# Patient Record
(demographics unavailable — no encounter records)

---

## 2018-11-12 NOTE — EKG
[facility]

              [address] [place] [address]

Test Date:    2018               Test Time:    11:24:12

Pat Name:     NICOLLE GUEVARA              Department:   

Patient ID:   [id number]           Room:          

Gender:       M                        Technician:   

:          1947               Requested By: KRISTINE RODRIGUEZ

Order Number: [id number]           Reading MD:   Crescencio Alexis MD

                                 Measurements

Intervals                              Axis          

Rate:         86                       P:            156

DC:           148                      QRS:          -22

QRSD:         70                       T:            151

QT:           350                                    

QTc:          422                                    

                           Interpretive Statements

SR

MISSING LEADS

NON-SPECIFIC ST/T CHANGES

Electronically Signed On 2018 10:57:37 CST by Crescencio Alexis MD

## 2018-11-12 NOTE — PHYS DOC
Past Medical History


Past Medical History:  High Cholesterol, Hypertension, MI


Past Surgical History:  Angioplasty, Coronary Bypass Surgery, Other


Additional Past Surgical Histo:  l hip sx, 5 vessle cabg, stents x6


Alcohol Use:  None


Drug Use:  None





Adult General


Chief Complaint


Chief Complaint:  PAIN ON URINATION





HPI


HPI





Patient is a 71  year old male with history of high cholesterol, hypertension, 

BPH, who presents today with difficulty voiding that began yesterday. Patient 

states he has enlarged prostate and is on medication. He states since yesterday 

he can only dribble small amount of urine and that's after pushing hard to 

void. Patient denies any hematuria. Denies any dysuria. He is also complaining 

of chronic shortness of breath. He states he recently stopped smoking. Patient 

denies any chest pain. He states he is supposed to be on an breathing 

treatments but he cannot afford the inhaler.





Review of Systems


Review of Systems





Constitutional: Denies fever or chills []


Eyes: Denies change in visual acuity, redness, or eye pain []


HENT: Denies nasal congestion or sore throat []


Respiratory: Reports chronic shortness of breath, denies coughing


Cardiovascular: No additional information not addressed in HPI []


GI: Denies abdominal pain, nausea, vomiting, bloody stools or diarrhea []


: Reports difficulty voiding. Denies dysuria or hematuria []


Musculoskeletal: Denies back pain or joint pain []


Integument: Denies rash or skin lesions []


Neurologic: Denies headache, focal weakness or sensory changes []








All other systems were reviewed and found to be within normal limits, except as 

documented in this note.





Current Medications


Current Medications





Current Medications








 Medications


  (Trade)  Dose


 Ordered  Sig/Claude  Start Time


 Stop Time Status Last Admin


Dose Admin


 


 Acetaminophen/


 Hydrocodone Bitart


  (Lortab 5/325)  1 tab  1X  ONCE  11/12/18 11:30


 11/12/18 11:33 DC 11/12/18 11:51


1 TAB


 


 Albuterol/


 Ipratropium


  (Duoneb)  3 ml  1X  ONCE  11/12/18 11:00


 11/12/18 11:01 DC 11/12/18 11:33


3 ML


 


 Ceftriaxone Sodium  50 ml @ 


 100 mls/hr  1X  ONCE  11/12/18 12:15


 11/12/18 12:44 DC 11/12/18 12:15


100 MLS/HR


 


 Morphine Sulfate


  (Morphine


 Sulfate)  5 mg  1X  ONCE  11/12/18 13:15


 11/12/18 13:16 DC 11/12/18 13:05


5 MG


 


 Tamsulosin HCl


  (Flomax)  0.4 mg  1X  ONCE  11/12/18 11:00


 11/12/18 11:01 DC 11/12/18 11:50


0.4 MG











Allergies


Allergies





Allergies








Coded Allergies Type Severity Reaction Last Updated Verified


 


  No Known Drug Allergies    9/4/15 No











Physical Exam


Physical Exam





Constitutional: Well developed, well nourished, no acute distress, non-toxic 

appearance. []


HENT: Normocephalic, atraumatic, bilateral external ears normal, oropharynx 

moist, no oral exudates, nose normal. []


Eyes: PERRLA, EOMI, conjunctiva normal, no discharge. [] 


Neck: Normal range of motion, no tenderness, supple, no stridor. [] 


Cardiovascular:Heart rate regular rhythm, no murmur []


Lungs & Thorax:  Bilateral breath sounds clear to auscultation []


Abdomen: Bowel sounds normal, soft, no tenderness, no masses, no pulsatile 

masses. [] 


Skin: Warm, dry, no erythema, no rash. [] 


Back: No tenderness, no CVA tenderness. [] 


Extremities: No tenderness, no cyanosis, no clubbing, ROM intact, no edema. [] 


Neurologic: Alert and oriented X 3, normal motor function, normal sensory 

function, no focal deficits noted. []


Psychologic: Affect normal, judgement normal, mood normal. []





Current Patient Data


Vital Signs





 Vital Signs








  Date Time  Temp Pulse Resp B/P (MAP) Pulse Ox O2 Delivery O2 Flow Rate FiO2


 


11/12/18 13:05   16  99   


 


11/12/18 11:33      Room Air  


 


11/12/18 10:15 99.2 93  118/55 (76)    





 99.2       








Lab Values





 Laboratory Tests








Test


 11/12/18


11:00 11/12/18


11:45


 


Urine Collection Type Unknown   


 


Urine Color Yellow   


 


Urine Clarity Turbid   


 


Urine pH 6.0   


 


Urine Specific Gravity 1.020   


 


Urine Protein


 30 mg/dL


(NEG-TRACE) 





 


Urine Glucose (UA)


 Negative mg/dL


(NEG) 





 


Urine Ketones (Stick)


 Negative mg/dL


(NEG) 





 


Urine Blood


 Moderate (NEG)


 





 


Urine Nitrite


 Positive (NEG)


 





 


Urine Bilirubin


 Negative (NEG)


 





 


Urine Urobilinogen Dipstick


 1.0 mg/dL (0.2


mg/dL) 





 


Urine Leukocyte Esterase Large (NEG)   


 


Urine RBC


 Occ /HPF (0-2)


 





 


Urine WBC


 >40 /HPF (0-4)


 





 


Urine Squamous Epithelial


Cells Occ /LPF  


 





 


Urine Bacteria


 Many /HPF


(0-FEW) 





 


White Blood Count


 


 20.7 x10^3/uL


(4.0-11.0)  H


 


Red Blood Count


 


 4.50 x10^6/uL


(4.30-5.70)


 


Hemoglobin


 


 13.6 g/dL


(13.0-17.5)


 


Hematocrit


 


 40.2 %


(39.0-53.0)


 


Mean Corpuscular Volume


 


 89 fL ()





 


Mean Corpuscular Hemoglobin  30 pg (25-35)  


 


Mean Corpuscular Hemoglobin


Concent 


 34 g/dL


(31-37)


 


Red Cell Distribution Width


 


 14.9 %


(11.5-14.5)  H


 


Platelet Count


 


 232 x10^3/uL


(140-400)


 


Neutrophils (%) (Auto)  85 % (31-73)  H


 


Lymphocytes (%) (Auto)  4 % (24-48)  L


 


Monocytes (%) (Auto)  11 % (0-9)  H


 


Eosinophils (%) (Auto)  0 % (0-3)  


 


Basophils (%) (Auto)  0 % (0-3)  


 


Neutrophils # (Auto)


 


 17.6 x10^3uL


(1.8-7.7)  H


 


Lymphocytes # (Auto)


 


 0.9 x10^3/uL


(1.0-4.8)  L


 


Monocytes # (Auto)


 


 2.2 x10^3/uL


(0.0-1.1)  H


 


Eosinophils # (Auto)


 


 0.0 x10^3/uL


(0.0-0.7)


 


Basophils # (Auto)


 


 0.0 x10^3/uL


(0.0-0.2)


 


Segmented Neutrophils %  75 % (35-66)  H


 


Band Neutrophils %  7 % (0-9)  


 


Lymphocytes %  6 % (24-48)  L


 


Monocytes %  12 % (0-10)  H


 


Toxic Granulation  Slight  


 


Platelet Estimate


 


 Adequate


(ADEQUATE)


 


Sodium Level


 


 138 mmol/L


(136-145)


 


Potassium Level


 


 4.1 mmol/L


(3.5-5.1)


 


Chloride Level


 


 101 mmol/L


()


 


Carbon Dioxide Level


 


 25 mmol/L


(21-32)


 


Anion Gap  12 (6-14)  


 


Blood Urea Nitrogen


 


 39 mg/dL


(8-26)  H


 


Creatinine


 


 2.2 mg/dL


(0.7-1.3)  H


 


Estimated GFR


(Cockcroft-Gault) 


 29.7  





 


BUN/Creatinine Ratio  18 (6-20)  


 


Glucose Level


 


 110 mg/dL


(70-99)  H


 


Calcium Level


 


 9.0 mg/dL


(8.5-10.1)


 


Magnesium Level


 


 1.9 mg/dL


(1.8-2.4)


 


Total Bilirubin


 


 1.8 mg/dL


(0.2-1.0)  H


 


Aspartate Amino Transferase


(AST) 


 16 U/L (15-37)





 


Alanine Aminotransferase (ALT)


 


 20 U/L (16-63)





 


Alkaline Phosphatase


 


 70 U/L


()


 


Troponin I Quantitative


 


 < 0.017 ng/mL


(0.000-0.055)


 


NT-Pro-B-Type Natriuretic


Peptide 


 5531 pg/mL


(0-124)  H


 


Total Protein


 


 7.3 g/dL


(6.4-8.2)


 


Albumin


 


 3.2 g/dL


(3.4-5.0)  L


 


Albumin/Globulin Ratio


 


 0.8 (1.0-1.7)


L


 


Thyroid Stimulating Hormone


(TSH) 


 1.746 uIU/mL


(0.358-3.74)





 Laboratory Tests


11/12/18 11:45








 Laboratory Tests


11/12/18 11:45














EKG


EKG


11:24 Interpreted by Dr. fernando sinus rhythm heart rate 86 no STEMI[]





Radiology/Procedures


Radiology/Procedures


[]PROCEDURE: CT ABDOMEN PELVIS WO CONTRAST





Examination: CT ABDOMEN PELVIS WO CONTRAST


 


History: inability to void/enlarged prostate


 


Comparison/Correlation: None


 


Findings: Axial images of the abdomen and pelvis were obtained without 


contrast. Sagittal and coronal provided. Lack of IV contrast may limit 


detection for mass lesions and inflammatory processes. Emphysematous 


involvement of the lung bases noted. Linear scarring or atelectasis 


involves the lingula.


 


Low-attenuation involving the intersegmental fissure region of the liver 


is present. There are 2 foci present and may represent focal fatty 


infiltration. Gallbladder fossa is unremarkable.


 


Spleen, pancreas, and adrenal glands are normal. Normal renal contours. No


hydronephrosis. No radiopaque collecting system calculus.


 


There is a diverticulum involving the right lateral upper urinary bladder 


and this measures up to 1.8 cm in diameter. Urinary bladder is grossly 


unremarkable otherwise. Evaluation of the lower urinary bladder is limited


due to left hip joint prosthesis and associated artifact.


 


Appendix is normal. No bowel obstruction. Minimal diverticulosis of the 


colon.


 


Prostate gland measures 5.7 cm transverse by 3.9 cm anteroposterior. No 


enlarged pelvic lymph nodes. No pelvic free fluid.


 


Kyphoplasty at L2 is noted.


 


Impression:


Prostatomegaly. No significant wall thickening of the urinary bladder. 


Urinary bladder is moderately distended. Urinary bladder diverticulum is 


small in size.


 


No radiopaque collecting system calculi.


 


Electronically signed by: Scott Rodriguez MD (11/12/2018 11:30 AM) [facility]














DICTATED and SIGNED BY:     SCOTT RODRIGUEZ MD


DATE:     11/12/18 1122


PROCEDURE: PORTABLE CHEST 1V





PORTABLE CHEST 1V


 


History: CHEST TIGHTNESS X TODAY.


 


Comparison: January 4, 2017


 


Cardiomediastinal silhouette is stable and not enlarged.


Subtle nodular opacity at the left lung base laterally, likely just 


overlapping rib and vascular shadows. No focal consolidation.


No pneumothorax identified.


No evidence of pleural effusion.  Postsurgical changes identified in the 


chest.


 


Impression:  Subtle nodular opacity at the left lung base laterally, not 


seen previously, but may just represent overlapping normal shadows. 


Consider short-term follow-up or CT chest. No acute findings. 


 


Electronically signed by: Jean Carlos Holder MD (11/12/2018 12:22 PM) 


[facility]














DICTATED and SIGNED BY:     JEAN CARLOS HOLDER MD


DATE:     11/12/18 1220





Course & Med Decision Making


Course & Med Decision Making


Pertinent Labs and Imaging studies reviewed. (See chart for details)





This is a 71-year-old male patient presented to the ED today complaining of 

difficulty voiding since yesterday, has history of enlarged prostate. Also 

complaining of chronic shortness of breath. On arrival to the ED temperature 

was 99.2, heart rate 93, O2 sats 97% on room air, respirations 16 on room air, 

blood pressure 118/55. Patient was given a DuoNeb treatment for his chronic 

shortness of breath. Guerrero was placed on arrival to the ED. 300 mL immediate 

drainage noted in the Guerrero bag.





EKG is negative for any acute findings, troponin is normal, CBC with WBC of 

20.7. CMP with creatinine of 2.2 BUN 39, patient creatinine has been going up 

looking at his previous labs.





Chest x-ray was noted for possible lung nodule with recommendation to follow-up 

as an outpatient for CT.





CT of the abdomen and pelvic without IV contrast was noted for prostatomegaly.





Patient was given Rocephin in the ED, also ordered Cipro on admission.





Consulted with Dr. Agarwal who accepted patient for admission. Patient was given 

IV fluids as well as. Routine consult placed for urology and nephrology.





Dragon Disclaimer


Dragon Disclaimer


This electronic medical record was generated, in whole or in part, using a 

voice recognition dictation system.





Departure


Departure


Impression:  


 Primary Impression:  


 Urinary retention


 Additional Impressions:  


 Acute pyelonephritis


 Leukocytosis


 Chronic shortness of breath


 Acute on chronic renal failure


Disposition:  09 ADMITTED AS INPATIENT


Condition:  LEFT WITHOUT BEING SEEN


Referrals:  


KARLOS GUAMAN (PCP)





Problem Qualifiers








 Additional Impressions:  


 Leukocytosis


 Leukocytosis type:  unspecified  Qualified Codes:  D72.829 - Elevated white 

blood cell count, unspecified


 Acute on chronic renal failure


 Acute renal failure type:  unspecified  Chronic kidney disease stage:  

unspecified stage  Qualified Codes:  N17.9 - Acute kidney failure, unspecified; 

N18.9 - Chronic kidney disease, unspecified








KRISTINE RODRIGUEZ APRN Nov 12, 2018 11:39

## 2018-11-12 NOTE — RAD
PORTABLE CHEST 1V

 

History: CHEST TIGHTNESS X TODAY.

 

Comparison: January 4, 2017

 

Cardiomediastinal silhouette is stable and not enlarged.

Subtle nodular opacity at the left lung base laterally, likely just 

overlapping rib and vascular shadows. No focal consolidation.

No pneumothorax identified.

No evidence of pleural effusion.  Postsurgical changes identified in the 

chest.

 

Impression:  Subtle nodular opacity at the left lung base laterally, not 

seen previously, but may just represent overlapping normal shadows. 

Consider short-term follow-up or CT chest. No acute findings. 

 

Electronically signed by: Jean Carlos Holder MD (11/12/2018 12:22 PM) 

[facility]

## 2018-11-12 NOTE — EKG
[facility]

              [address] [place] [address]

Test Date:    2018               Test Time:    11:58:09

Pat Name:     NICOLLE GUEVARA              Department:   

Patient ID:   [id number]           Room:          

Gender:       M                        Technician:   

:          1947               Requested By: KRISTINE RODRIGUEZ

Order Number: [id number]           Reading MD:   Crescencio Alexis MD

                                 Measurements

Intervals                              Axis          

Rate:         88                       P:            161

SD:           142                      QRS:          -23

QRSD:         78                       T:            150

QT:           378                                    

QTc:          461                                    

                           Interpretive Statements

SINUS RHYTHM

MISSING LEADS

Electronically Signed On 2018 10:57:58 CST by Cerscencio Alexis MD

## 2018-11-12 NOTE — RAD
Examination: CT ABDOMEN PELVIS WO CONTRAST

 

History: inability to void/enlarged prostate

 

Comparison/Correlation: None

 

Findings: Axial images of the abdomen and pelvis were obtained without 

contrast. Sagittal and coronal provided. Lack of IV contrast may limit 

detection for mass lesions and inflammatory processes. Emphysematous 

involvement of the lung bases noted. Linear scarring or atelectasis 

involves the lingula.

 

Low-attenuation involving the intersegmental fissure region of the liver 

is present. There are 2 foci present and may represent focal fatty 

infiltration. Gallbladder fossa is unremarkable.

 

Spleen, pancreas, and adrenal glands are normal. Normal renal contours. No

hydronephrosis. No radiopaque collecting system calculus.

 

There is a diverticulum involving the right lateral upper urinary bladder 

and this measures up to 1.8 cm in diameter. Urinary bladder is grossly 

unremarkable otherwise. Evaluation of the lower urinary bladder is limited

due to left hip joint prosthesis and associated artifact.

 

Appendix is normal. No bowel obstruction. Minimal diverticulosis of the 

colon.

 

Prostate gland measures 5.7 cm transverse by 3.9 cm anteroposterior. No 

enlarged pelvic lymph nodes. No pelvic free fluid.

 

Kyphoplasty at L2 is noted.

 

Impression:

Prostatomegaly. No significant wall thickening of the urinary bladder. 

Urinary bladder is moderately distended. Urinary bladder diverticulum is 

small in size.

 

No radiopaque collecting system calculi.

 

Electronically signed by: Scott Davis MD (11/12/2018 11:30 AM) [facility]

## 2018-11-12 NOTE — HP
ADMIT DATE:  11/12/2018



CHIEF COMPLAINT:  Difficulty voiding, pain with urination.



HISTORY OF PRESENT ILLNESS:  The patient is a pleasant 71-year-old male who

presents with the above chief complaints.  Basically for the past couple of

days, he has been having problems urinating.  He has painful urination as well

and cannot hardly go.  It has been worsening over the past few weeks, but in

particular the past couple of days.  He has a known history of BPH.  He has been

dribbling some urine.  We tried increasing his Flomax, but that did not seem to

work.  He denies any hematuria.  Rates his symptoms at 7/10 and describes them

as agonizing.  I discussed the case with ER physician.  The patient also has

renal failure with a creatinine of 2.2.  We are going to admit the patient and

consult Urology.



PAST MEDICAL HISTORY:  BPH, hypertension, myocardial infarction, angioplasty,

coronary bypass, 6 stents.



ALLERGIES:  None.



FAMILY HISTORY:  Diabetes and coronary artery disease.



SOCIAL HISTORY:  He is retired.  Does not drink, smoke or take drugs.



MEDICATIONS:  Reviewed, please refer to the MRAD.  He is on 7 home meds

including Plavix, Lipitor, metoprolol, lisinopril, aspirin, oxycodone, Protonix.



REVIEW OF SYSTEMS:

GENERAL:  No history of weight change, weakness or fevers.

SKIN:  No bruising, hair changes or rashes.

EYES:  No blurred, double or loss of vision.

NOSE AND THROAT:  No history of nosebleeds, hoarseness or sore throat.

HEART:  No history of palpitations, chest pain or shortness of breath on

exertion.

LUNGS:  Denies cough, hemoptysis, wheezing or shortness of breath.

GASTROINTESTINAL:  Denies changes in appetite, nausea, vomiting, diarrhea or

constipation.

GENITOURINARY:  He complains of dysuria and difficulty starting his stream.

NEUROLOGIC:  Denies history of numbness, tingling, tremor or weakness.

PSYCHIATRIC:  No history of panic, anxiety or depression.

ENDOCRINE:  No history of heat or cold intolerance, polyuria or polydipsia.

EXTREMITIES:  Denies muscle weakness, joint pain, pain on walking or stiffness.



PHYSICAL EXAMINATION:

VITAL SIGNS:  Temperature afebrile, pulse 74, respirations 18, blood pressure

142/81.

GENERAL:  He is alert, cooperative.  His family is present.

HEART:  Normal S1, S2.

LUNGS:  Clear to auscultation.

ABDOMEN:  Soft, little distended, tender.

EXTREMITIES:  Trace edema.

SKIN:  No rashes.

ENDOCRINE:  No thyromegaly.

LYMPHATICS:  No cervical nodes.

HEMATOPOIETIC:  No bruising.

PSYCHIATRIC:  He is stable.



LABORATORY DATA:  BUN 39, creatinine 2.2, white count 21.  Urinalysis shows a

UTI with large amount of leukocyte esterase and greater than 40 white cells.



ASSESSMENT AND PLAN:  Pyelonephritis, severe benign prostatic hypertrophy, acute

renal failure secondary to above.  The patient has been admitted.  We placed a

Guerrero.  We will start IV antibiotics, IV fluids.  Consult Urology.  Continue

home medicines.  Suspect he is going to need a TURP.



PROGNOSIS:  Guarded.

 



______________________________

LUANA WIGGINS DO



DR:  TILA/gurpreet  JOB#:  [id number] / [id number]

DD:  11/12/2018 17:24  DT:  11/12/2018 17:49

## 2018-11-13 NOTE — RAD
CT CHEST WO CONTRAST dated 11/13/2018 2:06 PM

 

Indication: Pulmonary nodule, chronic shortness of breathF/U NODULE.<BR>.

 

Comparison: Chest x-ray dated 11/12/2018. CT abdomen dated 11/12/2018.

 

Technique: Contiguous axial imaging of the chest performed without the 

administration of intravenous contrast. 

One or more of the following individualized dose reduction techniques were

utilized for this examination:  1. Automated exposure control  2. 

Adjustment of the mA and/or kV according to patient size  3. Use of 

iterative reconstruction technique

 

Findings: 

Linear bands of increased density in the bilateral lower lobes and 

lingula, likely scar or atelectasis. There is also some thickening of the 

major fissure on the right. No consolidation or pleural effusion. No 

pneumothorax. There is mild emphysema. Mild diffuse bronchial wall 

thickening.

 

Heart size within normal limits. Extensive coronary artery calcifications 

status post CABG procedure. Mild ectasia of the ascending thoracic aorta 

measuring 3.8 cm transverse. No mediastinal, hilar or axillary 

lymphadenopathy. Borderline enlarged subcarinal lymph node measures 9 mm 

short axis. Thyroid gland unremarkable.

 

Limited images of the upper abdomen show a couple of vague low-density 

lesions in the left lobe liver on images 59 and 60, measuring up to 10 mm 

maximum dimension. These are not well evaluated in the absence of contrast

material. Spleen is normal in size. Pancreas is somewhat atrophic.

 

Bone windows show no acute findings. Multilevel spondylosis. There is 

evidence of prior vertebroplasty at L2. Multilevel spondylosis. Prominent 

anterior osteophytes with partial fusion.

 

IMPRESSION:

1. No suspicious pulmonary nodule or mass.

2. Bibasilar scar or atelectasis with diffuse bronchial wall thickening.

3. Mild emphysema.

4. Mild ectasia of the ascending thoracic aorta.

5. Indeterminate small low-density nodules in the liver, stable from prior

exam.   

 

Electronically signed by: Jean Carlos Restrepo MD (11/13/2018 5:12 PM) 

[facility]

## 2018-11-13 NOTE — PDOC2
CONSULT


Date of Consult


Date of Consult


DATE: 11/13/18 


TIME: 10:55





Reason for Consult


Reason for Consult:


FAWAD





Referring Physician


Referring Physician:


FELICIANO





Identification/Chief Complaint


Chief Complaint


CANT URINATE





Source


Source:  Chart review, Patient





History of Present Illness


Reason for Visit:


THIS IS A 71 YR OLD WITH SEVERAL DAY HX OF NOT BEING ABLE TO URINATE WELL. ON 

ADMIT HAD RETENTION OF ABOUT ONE LITER. CR OF 2.2. USUAL BASELINE CR IS ABOUT 

1.2-1.3 C/W STAGE 3 CKD. UTI NOTED WITH POSSIBLE PYELONEPHRITIS. HAS HX OF HTN 

AND BPH. NO NSAIDS OR OTHER NEPHROTOXINS. NO OTHER  HX





Past Medical History


Cardiovascular:  CAD, CHF, HTN, Hyperlipidemia


Pulmonary:  COPD


CENTRAL NERVOUS SYSTEM:  Other


GI:  GERD


Heme/Onc:  No pertinent hx


Hepatobiliary:  No pertinent hx


Psych:  No pertinent hx


Musculoskeletal:  Osteoarthritis


Rheumatologic:  No pertinent hx


Infectious disease:  No pertinent hx


Renal/:  Chronic renal insuff, Benign prostatic enlarg.


Endocrine:  No pertinent hx





Past Surgical History


Past Surgical History:  Other





Family History


Family History:  Hypertension





Social History


Quit (2 weeks ago)


ALCOHOL:  none


Drugs:  None


Lives:  with Family





Current Problem List


Problem List


Problems


Medical Problems:


(1) Acute on chronic renal failure


Status: Acute  





(2) Chronic shortness of breath


Status: Acute  





(3) Leukocytosis


Status: Acute  











Current Medications


Current Medications





Current Medications


Tamsulosin HCl (Flomax) 0.4 mg 1X  ONCE PO  Last administered on 11/12/18at 11:

50;  Start 11/12/18 at 11:00;  Stop 11/12/18 at 11:01;  Status DC


Albuterol/ Ipratropium (Duoneb) 3 ml 1X  ONCE NEB  Last administered on 11/12/ 18at 11:33;  Start 11/12/18 at 11:00;  Stop 11/12/18 at 11:01;  Status DC


Acetaminophen/ Hydrocodone Bitart (Lortab 5/325) 1 tab 1X  ONCE PO  Last 

administered on 11/12/18at 11:51;  Start 11/12/18 at 11:30;  Stop 11/12/18 at 11

:33;  Status DC


Ceftriaxone Sodium 50 ml @  100 mls/hr 1X  ONCE IV  Last administered on 11/12/ 18at 12:15;  Start 11/12/18 at 12:15;  Stop 11/12/18 at 12:44;  Status DC


Morphine Sulfate (Morphine Sulfate) 5 mg 1X  ONCE IV  Last administered on 11/12 /18at 13:05;  Start 11/12/18 at 13:15;  Stop 11/12/18 at 13:16;  Status DC


Sodium Chloride 1,000 ml @  1,000 mls/hr 1X  ONCE IV  Last administered on 11/12 /18at 14:31;  Start 11/12/18 at 14:30;  Stop 11/12/18 at 15:29;  Status DC


Ondansetron HCl (Zofran) 4 mg PRN Q8HRS  PRN IV NAUSEA/VOMITING Last 

administered on 11/12/18at 21:11;  Start 11/12/18 at 14:30;  Stop 11/13/18 at 14

:29


Morphine Sulfate (Morphine Sulfate) 4 mg PRN Q2HR  PRN IV PAIN;  Start 11/12/18 

at 14:30;  Stop 11/13/18 at 14:29


Acetaminophen (Tylenol) 650 mg PRN Q4HRS  PRN PO FEVER;  Start 11/12/18 at 14:30

;  Stop 11/13/18 at 14:29


Sodium Chloride 1,000 ml @  75 mls/hr [id number] IV  Last administered on 11/13/ 18at 05:15;  Start 11/12/18 at 14:30


Ciprofloxacin/ Dextrose 200 ml @  200 mls/hr Q12HR IV  Last administered on 11/ 13/18at 08:08;  Start 11/12/18 at 21:00


Tamsulosin HCl (Flomax) 0.4 mg DAILY PO  Last administered on 11/13/18at 08:09;

  Start 11/13/18 at 09:00


Ondansetron HCl (Zofran) 4 mg 1X  ONCE IV  Last administered on 11/12/18at 14:31

;  Start 11/12/18 at 14:30;  Stop 11/12/18 at 14:31;  Status DC


Aspirin (Ecotrin) 325 mg DAILY PO  Last administered on 11/13/18at 08:08;  

Start 11/13/18 at 09:00


Atorvastatin Calcium (Lipitor) 20 mg QHS PO  Last administered on 11/12/18at 20:

31;  Start 11/12/18 at 21:00


Metoprolol Succinate (Toprol Xl) 25 mg HS PO ;  Start 11/12/18 at 21:00


Pantoprazole Sodium (Protonix) 40 mg DAILYAC PO  Last administered on 11/13/ 18at 08:08;  Start 11/13/18 at 07:30


Lisinopril (Prinivil) 2.5 mg DAILY PO ;  Start 11/13/18 at 09:00


Oxycodone/ Acetaminophen (Percocet 5/325) 2 tab PRN Q4HRS  PRN PO MODERATE PAIN

, 2ND CHOICE Last administered on 11/12/18at 20:30;  Start 11/12/18 at 17:45


Tamsulosin HCl (Flomax) 0.4 mg QHS PO  Last administered on 11/12/18at 20:31;  

Start 11/12/18 at 21:00


Sodium Chloride 1,000 ml @  75 mls/hr [id number] IV ;  Start 11/12/18 at 17:45;  

Stop 11/12/18 at 19:12;  Status DC


Promethazine HCl/ Codeine (Phenergan With Codeine Oral Syrup) 5 ml PRN Q4HRS  

PRN PO COUGH Last administered on 11/13/18at 08:08;  Start 11/12/18 at 20:45


Promethazine HCl/ Codeine (Phenergan With Codeine Oral Syrup) 10 ml PRN Q4HRS  

PRN PO COUGH;  Start 11/12/18 at 20:45


Sodium Chloride 500 ml @  500 mls/hr 1X  ONCE IV  Last administered on 11/13/ 18at 05:19;  Start 11/13/18 at 05:30;  Stop 11/13/18 at 06:29;  Status DC


Influenza Virus Vaccine (Afluria Trivalent [id number] Syringe) 0.5 ml ONCE ONCE 

VAX IM  Last administered on 11/13/18at 09:45;  Start 11/13/18 at 11:00;  Stop 

11/13/18 at 11:01


Lactobacillus Rhamnosus (Culturelle) 1 cap BID PO ;  Start 11/13/18 at 21:00





Active Scripts


Active


[Oxycodone Hcl/Acetaminophen] 1 TAB Tablet 2 Tab PO PRN Q4HRS PRN


Plavix (Clopidogrel Bisulfate) 75 Mg Tablet 75 Mg PO [id number]


Lipitor (Atorvastatin Calcium) 20 Mg Tablet 20 Mg PO QHS


Reported


Lisinopril 2.5 Mg Tablet 2.5 Mg PO DAILY


Metoprolol Succinate ( Xl ) (Metoprolol Succinate) 25 Mg Tab.er.24h 25 Mg PO HS


Protonix (Pantoprazole Sodium) 40 Mg Tablet. 40 Mg PO DAILY


Oxycodone-Acetaminophen 5-325 (Oxycodone Hcl/Acetaminophen) 1 Each Tablet 2 

Each PO Q6HRS PRN


Aspirin Ec (Aspirin) 325 Mg Tablet.dr 1 Tab PO DAILY





Allergies


Allergies:  


Coded Allergies:  


     No Known Drug Allergies (Unverified , 9/4/15)





ROS


General:  YES: Fatigue, Malaise, Appetite


PSYCHOLOGICAL ROS:  YES: Anxiety


Eyes:  Yes Decreased vision


HEENT:  YES: Heacaches


Respiratory:  YES: Cough


Gastrointestinal:  Yes Nausea


Genitourinary:  YES Retention


Musculoskeletal:  Yes Muscular Weakness


Neurological:  Yes Weakness


Skin:  Yes Dry Skin





Physical Exam


General:  Alert, Oriented X3, Cooperative, No acute distress


HEENT:  Atraumatic, PERRLA, EOMI, Mucous membr. moist/pink


Lungs:  Clear to auscultation


Heart:  Regular rate, Normal S1, Normal S2


Abdomen:  Normal bowel sounds, Soft


Extremities:  No clubbing


Skin:  No rashes


Neuro:  Normal speech, Cranial nerves 3-12 NL


Psych/Mental Status:  Mental status NL, Mood NL


MUSCULOSKELETAL:  No deformity, No swelling





Vitals


VITALS





Vital Signs








  Date Time  Temp Pulse Resp B/P (MAP) Pulse Ox O2 Delivery O2 Flow Rate FiO2


 


11/13/18 08:09  65  95/56    


 


11/13/18 07:22      Room Air  


 


11/13/18 07:00 97.7  20  95   





 97.7       











Labs


Labs





Laboratory Tests








Test


 11/12/18


11:00 11/12/18


11:45 11/12/18


15:07 11/13/18


05:35


 


Urine Collection Type Unknown    


 


Urine Color Yellow    


 


Urine Clarity Turbid    


 


Urine pH 6.0    


 


Urine Specific Gravity 1.020    


 


Urine Protein


 30 mg/dL


(NEG-TRACE) 


 


 





 


Urine Glucose (UA)


 Negative mg/dL


(NEG) 


 


 





 


Urine Ketones (Stick)


 Negative mg/dL


(NEG) 


 


 





 


Urine Blood Moderate (NEG)    


 


Urine Nitrite Positive (NEG)    


 


Urine Bilirubin Negative (NEG)    


 


Urine Urobilinogen Dipstick


 1.0 mg/dL (0.2


mg/dL) 


 


 





 


Urine Leukocyte Esterase Large (NEG)    


 


Urine RBC Occ /HPF (0-2)    


 


Urine WBC >40 /HPF (0-4)    


 


Urine Squamous Epithelial


Cells Occ /LPF 


 


 


 





 


Urine Bacteria


 Many /HPF


(0-FEW) 


 


 





 


White Blood Count


 


 20.7 x10^3/uL


(4.0-11.0) 


 16.2 x10^3/uL


(4.0-11.0)


 


Red Blood Count


 


 4.50 x10^6/uL


(4.30-5.70) 


 3.71 x10^6/uL


(4.30-5.70)


 


Hemoglobin


 


 13.6 g/dL


(13.0-17.5) 


 11.3 g/dL


(13.0-17.5)


 


Hematocrit


 


 40.2 %


(39.0-53.0) 


 33.5 %


(39.0-53.0)


 


Mean Corpuscular Volume  89 fL ()   90 fL () 


 


Mean Corpuscular Hemoglobin  30 pg (25-35)   31 pg (25-35) 


 


Mean Corpuscular Hemoglobin


Concent 


 34 g/dL


(31-37) 


 34 g/dL


(31-37)


 


Red Cell Distribution Width


 


 14.9 %


(11.5-14.5) 


 15.2 %


(11.5-14.5)


 


Platelet Count


 


 232 x10^3/uL


(140-400) 


 166 x10^3/uL


(140-400)


 


Neutrophils (%) (Auto)  85 % (31-73)   81 % (31-73) 


 


Lymphocytes (%) (Auto)  4 % (24-48)   9 % (24-48) 


 


Monocytes (%) (Auto)  11 % (0-9)   10 % (0-9) 


 


Eosinophils (%) (Auto)  0 % (0-3)   0 % (0-3) 


 


Basophils (%) (Auto)  0 % (0-3)   1 % (0-3) 


 


Neutrophils # (Auto)


 


 17.6 x10^3uL


(1.8-7.7) 


 13.1 x10^3uL


(1.8-7.7)


 


Lymphocytes # (Auto)


 


 0.9 x10^3/uL


(1.0-4.8) 


 1.4 x10^3/uL


(1.0-4.8)


 


Monocytes # (Auto)


 


 2.2 x10^3/uL


(0.0-1.1) 


 1.5 x10^3/uL


(0.0-1.1)


 


Eosinophils # (Auto)


 


 0.0 x10^3/uL


(0.0-0.7) 


 0.0 x10^3/uL


(0.0-0.7)


 


Basophils # (Auto)


 


 0.0 x10^3/uL


(0.0-0.2) 


 0.1 x10^3/uL


(0.0-0.2)


 


Segmented Neutrophils %  75 % (35-66)   


 


Band Neutrophils %  7 % (0-9)   


 


Lymphocytes %  6 % (24-48)   


 


Monocytes %  12 % (0-10)   


 


Toxic Granulation  Slight   


 


Platelet Estimate


 


 Adequate


(ADEQUATE) 


 





 


Sodium Level


 


 138 mmol/L


(136-145) 


 137 mmol/L


(136-145)


 


Potassium Level


 


 4.1 mmol/L


(3.5-5.1) 


 4.2 mmol/L


(3.5-5.1)


 


Chloride Level


 


 101 mmol/L


() 


 104 mmol/L


()


 


Carbon Dioxide Level


 


 25 mmol/L


(21-32) 


 24 mmol/L


(21-32)


 


Anion Gap  12 (6-14)   9 (6-14) 


 


Blood Urea Nitrogen


 


 39 mg/dL


(8-26) 


 31 mg/dL


(8-26)


 


Creatinine


 


 2.2 mg/dL


(0.7-1.3) 


 1.8 mg/dL


(0.7-1.3)


 


Estimated GFR


(Cockcroft-Gault) 


 29.7 


 


 37.4 





 


BUN/Creatinine Ratio  18 (6-20)   


 


Glucose Level


 


 110 mg/dL


(70-99) 


 107 mg/dL


(70-99)


 


Calcium Level


 


 9.0 mg/dL


(8.5-10.1) 


 8.3 mg/dL


(8.5-10.1)


 


Magnesium Level


 


 1.9 mg/dL


(1.8-2.4) 


 





 


Total Bilirubin


 


 1.8 mg/dL


(0.2-1.0) 


 





 


Aspartate Amino Transf


(AST/SGOT) 


 16 U/L (15-37) 


 


 





 


Alanine Aminotransferase


(ALT/SGPT) 


 20 U/L (16-63) 


 


 





 


Alkaline Phosphatase


 


 70 U/L


() 


 





 


Troponin I Quantitative


 


 < 0.017 ng/mL


(0.000-0.055) 


 





 


NT-Pro-B-Type Natriuretic


Peptide 


 5531 pg/mL


(0-124) 


 





 


Total Protein


 


 7.3 g/dL


(6.4-8.2) 


 





 


Albumin


 


 3.2 g/dL


(3.4-5.0) 


 





 


Albumin/Globulin Ratio  0.8 (1.0-1.7)   


 


Thyroid Stimulating Hormone


(TSH) 


 1.746 uIU/mL


(0.358-3.74) 


 





 


Lactic Acid Level


 


 


 2.3 mmol/L


(0.4-2.0) 











Laboratory Tests








Test


 11/12/18


11:00 11/12/18


11:45 11/12/18


15:07 11/13/18


05:35


 


Urine Collection Type Unknown    


 


Urine Color Yellow    


 


Urine Clarity Turbid    


 


Urine pH 6.0    


 


Urine Specific Gravity 1.020    


 


Urine Protein


 30 mg/dL


(NEG-TRACE) 


 


 





 


Urine Glucose (UA)


 Negative mg/dL


(NEG) 


 


 





 


Urine Ketones (Stick)


 Negative mg/dL


(NEG) 


 


 





 


Urine Blood Moderate (NEG)    


 


Urine Nitrite Positive (NEG)    


 


Urine Bilirubin Negative (NEG)    


 


Urine Urobilinogen Dipstick


 1.0 mg/dL (0.2


mg/dL) 


 


 





 


Urine Leukocyte Esterase Large (NEG)    


 


Urine RBC Occ /HPF (0-2)    


 


Urine WBC >40 /HPF (0-4)    


 


Urine Squamous Epithelial


Cells Occ /LPF 


 


 


 





 


Urine Bacteria


 Many /HPF


(0-FEW) 


 


 





 


White Blood Count


 


 20.7 x10^3/uL


(4.0-11.0) 


 16.2 x10^3/uL


(4.0-11.0)


 


Red Blood Count


 


 4.50 x10^6/uL


(4.30-5.70) 


 3.71 x10^6/uL


(4.30-5.70)


 


Hemoglobin


 


 13.6 g/dL


(13.0-17.5) 


 11.3 g/dL


(13.0-17.5)


 


Hematocrit


 


 40.2 %


(39.0-53.0) 


 33.5 %


(39.0-53.0)


 


Mean Corpuscular Volume  89 fL ()   90 fL () 


 


Mean Corpuscular Hemoglobin  30 pg (25-35)   31 pg (25-35) 


 


Mean Corpuscular Hemoglobin


Concent 


 34 g/dL


(31-37) 


 34 g/dL


(31-37)


 


Red Cell Distribution Width


 


 14.9 %


(11.5-14.5) 


 15.2 %


(11.5-14.5)


 


Platelet Count


 


 232 x10^3/uL


(140-400) 


 166 x10^3/uL


(140-400)


 


Neutrophils (%) (Auto)  85 % (31-73)   81 % (31-73) 


 


Lymphocytes (%) (Auto)  4 % (24-48)   9 % (24-48) 


 


Monocytes (%) (Auto)  11 % (0-9)   10 % (0-9) 


 


Eosinophils (%) (Auto)  0 % (0-3)   0 % (0-3) 


 


Basophils (%) (Auto)  0 % (0-3)   1 % (0-3) 


 


Neutrophils # (Auto)


 


 17.6 x10^3uL


(1.8-7.7) 


 13.1 x10^3uL


(1.8-7.7)


 


Lymphocytes # (Auto)


 


 0.9 x10^3/uL


(1.0-4.8) 


 1.4 x10^3/uL


(1.0-4.8)


 


Monocytes # (Auto)


 


 2.2 x10^3/uL


(0.0-1.1) 


 1.5 x10^3/uL


(0.0-1.1)


 


Eosinophils # (Auto)


 


 0.0 x10^3/uL


(0.0-0.7) 


 0.0 x10^3/uL


(0.0-0.7)


 


Basophils # (Auto)


 


 0.0 x10^3/uL


(0.0-0.2) 


 0.1 x10^3/uL


(0.0-0.2)


 


Segmented Neutrophils %  75 % (35-66)   


 


Band Neutrophils %  7 % (0-9)   


 


Lymphocytes %  6 % (24-48)   


 


Monocytes %  12 % (0-10)   


 


Toxic Granulation  Slight   


 


Platelet Estimate


 


 Adequate


(ADEQUATE) 


 





 


Sodium Level


 


 138 mmol/L


(136-145) 


 137 mmol/L


(136-145)


 


Potassium Level


 


 4.1 mmol/L


(3.5-5.1) 


 4.2 mmol/L


(3.5-5.1)


 


Chloride Level


 


 101 mmol/L


() 


 104 mmol/L


()


 


Carbon Dioxide Level


 


 25 mmol/L


(21-32) 


 24 mmol/L


(21-32)


 


Anion Gap  12 (6-14)   9 (6-14) 


 


Blood Urea Nitrogen


 


 39 mg/dL


(8-26) 


 31 mg/dL


(8-26)


 


Creatinine


 


 2.2 mg/dL


(0.7-1.3) 


 1.8 mg/dL


(0.7-1.3)


 


Estimated GFR


(Cockcroft-Gault) 


 29.7 


 


 37.4 





 


BUN/Creatinine Ratio  18 (6-20)   


 


Glucose Level


 


 110 mg/dL


(70-99) 


 107 mg/dL


(70-99)


 


Calcium Level


 


 9.0 mg/dL


(8.5-10.1) 


 8.3 mg/dL


(8.5-10.1)


 


Magnesium Level


 


 1.9 mg/dL


(1.8-2.4) 


 





 


Total Bilirubin


 


 1.8 mg/dL


(0.2-1.0) 


 





 


Aspartate Amino Transf


(AST/SGOT) 


 16 U/L (15-37) 


 


 





 


Alanine Aminotransferase


(ALT/SGPT) 


 20 U/L (16-63) 


 


 





 


Alkaline Phosphatase


 


 70 U/L


() 


 





 


Troponin I Quantitative


 


 < 0.017 ng/mL


(0.000-0.055) 


 





 


NT-Pro-B-Type Natriuretic


Peptide 


 5531 pg/mL


(0-124) 


 





 


Total Protein


 


 7.3 g/dL


(6.4-8.2) 


 





 


Albumin


 


 3.2 g/dL


(3.4-5.0) 


 





 


Albumin/Globulin Ratio  0.8 (1.0-1.7)   


 


Thyroid Stimulating Hormone


(TSH) 


 1.746 uIU/mL


(0.358-3.74) 


 





 


Lactic Acid Level


 


 


 2.3 mmol/L


(0.4-2.0) 














Assessment/Plan


Assessment/Plan


IMP





FAWAD CR OF 2.2


CKD STAGE 3 WITH CR OF 1.2-1.3


URINARY RETENTION


PROSTATOMEGALY


UTI/POSSIBLE PYELONEPHRITIS





PLAN





LOONEY


UROLOGY EVAL


ANTIBIOTICS


IVF'S


HOLD ACE-I IF CR NOT IMPROVING


WILL FOLLOW











JAVY SHANNON MD Nov 13, 2018 11:01

## 2018-11-13 NOTE — PDOC2
UROLOGY CONSULT


Date of Consult


Date of Consult


DATE: 11/13/18 


TIME: 08:33





Reason for Consult


Reason for Consult:


Urinary Retention





Referring Physician


Referring Physician:


Dr. Agarwal.





Identification/Chief Complaint


Chief Complaint


Urinary retention/BPH





Source


Source:  Caregiver, Chart review, Patient





History of Present Illness


Reason for Visit:


Patient is a 71  year old male with history of high cholesterol, hypertension, 

BPH, who presented yesterday to the ER with difficulty voiding.  He has a 

history of BPH that he takes Flomax for.  This problem had really been going on 

for 2-3 days and it got really bad yesterday so he came into [facility] ED.  A 

catheter was placed, and the amount out is not charted, but the patient does 

report an output of about one large bag full or 1000 ml.  He denies a history 

of prostate cancer or kidney problems or stones. He also denies hematuria. He 

is not having any pain today and his shaw catheter is not bothering him.





Past Medical History


Cardiovascular:  CAD, CHF, HTN, Hyperlipidemia


Pulmonary:  COPD


CENTRAL NERVOUS SYSTEM:  Other


GI:  GERD


Heme/Onc:  No pertinent hx


Hepatobiliary:  No pertinent hx


Psych:  No pertinent hx


Musculoskeletal:  Osteoarthritis


Rheumatologic:  No pertinent hx


Infectious disease:  No pertinent hx


Renal/:  No pertinent hx (Diagnosed by PCP, on flomax as outpatient.), Benign 

prostatic enlarg.


Endocrine:  No pertinent hx





Past Surgical History


Past Surgical History:  Other





Family History


Family History:  Hypertension





Social History


Quit (2 weeks ago)


ALCOHOL:  none


Drugs:  None


Lives:  with Family





Current Problem List


Problems:  


(1) Shaw catheter in place


(2) Urinary retention





Current Medications


Current Medications





Current Medications


Acetaminophen (Tylenol) 650 mg PRN Q4HRS  PRN PO FEVER;  Start 11/12/18 at 14:30

;  Stop 11/13/18 at 14:29


Acetaminophen/ Hydrocodone Bitart (Lortab 5/325) 1 tab 1X  ONCE PO  Last 

administered on 11/12/18at 11:51;  Start 11/12/18 at 11:30;  Stop 11/12/18 at 11

:33;  Status DC


Albuterol/ Ipratropium (Duoneb) 3 ml 1X  ONCE NEB  Last administered on 11/12/ 18at 11:33;  Start 11/12/18 at 11:00;  Stop 11/12/18 at 11:01;  Status DC


Aspirin (Ecotrin) 325 mg DAILY PO  Last administered on 11/13/18at 08:08;  

Start 11/13/18 at 09:00


Atorvastatin Calcium (Lipitor) 20 mg QHS PO  Last administered on 11/12/18at 20:

31;  Start 11/12/18 at 21:00


Ceftriaxone Sodium 50 ml @  100 mls/hr 1X  ONCE IV  Last administered on 11/12/ 18at 12:15;  Start 11/12/18 at 12:15;  Stop 11/12/18 at 12:44;  Status DC


Ciprofloxacin/ Dextrose 200 ml @  200 mls/hr Q12HR IV  Last administered on 11/ 13/18at 08:08;  Start 11/12/18 at 21:00


Lisinopril (Prinivil) 2.5 mg DAILY PO ;  Start 11/13/18 at 09:00


Metoprolol Succinate (Toprol Xl) 25 mg HS PO ;  Start 11/12/18 at 21:00


Morphine Sulfate (Morphine Sulfate) 4 mg PRN Q2HR  PRN IV PAIN;  Start 11/12/18 

at 14:30;  Stop 11/13/18 at 14:29


Morphine Sulfate (Morphine Sulfate) 5 mg 1X  ONCE IV  Last administered on 11/12 /18at 13:05;  Start 11/12/18 at 13:15;  Stop 11/12/18 at 13:16;  Status DC


Ondansetron HCl (Zofran) 4 mg 1X  ONCE IV  Last administered on 11/12/18at 14:31

;  Start 11/12/18 at 14:30;  Stop 11/12/18 at 14:31;  Status DC


Ondansetron HCl (Zofran) 4 mg PRN Q8HRS  PRN IV NAUSEA/VOMITING Last 

administered on 11/12/18at 21:11;  Start 11/12/18 at 14:30;  Stop 11/13/18 at 14

:29


Oxycodone/ Acetaminophen (Percocet 5/325) 2 tab PRN Q4HRS  PRN PO MODERATE PAIN

, 2ND CHOICE Last administered on 11/12/18at 20:30;  Start 11/12/18 at 17:45


Pantoprazole Sodium (Protonix) 40 mg DAILYAC PO  Last administered on 11/13/ 18at 08:08;  Start 11/13/18 at 07:30


Promethazine HCl/ Codeine (Phenergan With Codeine Oral Syrup) 5 ml PRN Q4HRS  

PRN PO COUGH Last administered on 11/13/18at 08:08;  Start 11/12/18 at 20:45


Promethazine HCl/ Codeine (Phenergan With Codeine Oral Syrup) 10 ml PRN Q4HRS  

PRN PO COUGH;  Start 11/12/18 at 20:45


Sodium Chloride 500 ml @  500 mls/hr 1X  ONCE IV  Last administered on 11/13/ 18at 05:19;  Start 11/13/18 at 05:30;  Stop 11/13/18 at 06:29;  Status DC


Sodium Chloride 1,000 ml @  75 mls/hr [id number] IV  Last administered on 11/13/ 18at 05:15;  Start 11/12/18 at 14:30


Sodium Chloride 1,000 ml @  75 mls/hr [id number] IV ;  Start 11/12/18 at 17:45;  

Stop 11/12/18 at 19:12;  Status DC


Sodium Chloride 1,000 ml @  1,000 mls/hr 1X  ONCE IV  Last administered on 11/12 /18at 14:31;  Start 11/12/18 at 14:30;  Stop 11/12/18 at 15:29;  Status DC


Tamsulosin HCl (Flomax) 0.4 mg 1X  ONCE PO  Last administered on 11/12/18at 11:

50;  Start 11/12/18 at 11:00;  Stop 11/12/18 at 11:01;  Status DC


Tamsulosin HCl (Flomax) 0.4 mg DAILY PO  Last administered on 11/13/18at 08:09;

  Start 11/13/18 at 09:00


Tamsulosin HCl (Flomax) 0.4 mg QHS PO  Last administered on 11/12/18at 20:31;  

Start 11/12/18 at 21:00





Allergies


Allergies:  


Coded Allergies:  


     No Known Drug Allergies (Unverified , 9/4/15)





ROS


Review Of Systems:


CONSTITUTIONAL:        No fever or chills


EYES:                          No recent changes


SKIN:               No rash or itching


CARDIOVASCULAR:     No chest pain, syncope, palpitations, or edema


RESPIRATORY:            No SOB or cough


GASTROINTESTINAL:    No nausea, vomiting or abdominal pain


NEUROLOGICAL:          No headaches or weakness


ENDOCRINE:               No cold or heat intolerance


GENITOURINARY:         + frequency, urinary retention, BPH


MUSCULOSKELETAL:   No back pain or joint pain


LYMPHATICS:               No enlarged lymph nodes


PSYCHIATRIC:              No anxiety or depression





Physical Exam


Physical Exam:


General: Pleasant, no acute distress, well groomed


Eyes: conjunctiva anicteric, eyes full range of motion


ENT: moist oral mucosa, normal dentition


Neck: Trachea midline, no masses


Respiratory: unlabored breathing, not using accessory muscles


Abdomen: nontender, nondistended


Pelvic: Uncirc phallus, WNL. Shaw catheter in place draining clear yellow 

urine. Device in good working order. 


Skin: no rashes or skin lesions on visualized skin


Psych: normal mood, affect. Alert and oriented x 3.





Vitals


VITALS





Vital Signs








  Date Time  Temp Pulse Resp B/P (MAP) Pulse Ox O2 Delivery O2 Flow Rate FiO2


 


11/13/18 08:09  65  95/56    


 


11/13/18 07:22      Room Air  


 


11/13/18 07:00 97.7  20  95   





 97.7       











Labs


Labs





Laboratory Tests








Test


 11/12/18


11:00 11/12/18


11:45 11/12/18


15:07 11/13/18


05:35


 


Urine Collection Type Unknown    


 


Urine Color Yellow    


 


Urine Clarity Turbid    


 


Urine pH 6.0    


 


Urine Specific Gravity 1.020    


 


Urine Protein


 30 mg/dL


(NEG-TRACE) 


 


 





 


Urine Glucose (UA)


 Negative mg/dL


(NEG) 


 


 





 


Urine Ketones (Stick)


 Negative mg/dL


(NEG) 


 


 





 


Urine Blood Moderate (NEG)    


 


Urine Nitrite Positive (NEG)    


 


Urine Bilirubin Negative (NEG)    


 


Urine Urobilinogen Dipstick


 1.0 mg/dL (0.2


mg/dL) 


 


 





 


Urine Leukocyte Esterase Large (NEG)    


 


Urine RBC Occ /HPF (0-2)    


 


Urine WBC >40 /HPF (0-4)    


 


Urine Squamous Epithelial


Cells Occ /LPF 


 


 


 





 


Urine Bacteria


 Many /HPF


(0-FEW) 


 


 





 


White Blood Count


 


 20.7 x10^3/uL


(4.0-11.0) 


 16.2 x10^3/uL


(4.0-11.0)


 


Red Blood Count


 


 4.50 x10^6/uL


(4.30-5.70) 


 3.71 x10^6/uL


(4.30-5.70)


 


Hemoglobin


 


 13.6 g/dL


(13.0-17.5) 


 11.3 g/dL


(13.0-17.5)


 


Hematocrit


 


 40.2 %


(39.0-53.0) 


 33.5 %


(39.0-53.0)


 


Mean Corpuscular Volume  89 fL ()   90 fL () 


 


Mean Corpuscular Hemoglobin  30 pg (25-35)   31 pg (25-35) 


 


Mean Corpuscular Hemoglobin


Concent 


 34 g/dL


(31-37) 


 34 g/dL


(31-37)


 


Red Cell Distribution Width


 


 14.9 %


(11.5-14.5) 


 15.2 %


(11.5-14.5)


 


Platelet Count


 


 232 x10^3/uL


(140-400) 


 166 x10^3/uL


(140-400)


 


Neutrophils (%) (Auto)  85 % (31-73)   81 % (31-73) 


 


Lymphocytes (%) (Auto)  4 % (24-48)   9 % (24-48) 


 


Monocytes (%) (Auto)  11 % (0-9)   10 % (0-9) 


 


Eosinophils (%) (Auto)  0 % (0-3)   0 % (0-3) 


 


Basophils (%) (Auto)  0 % (0-3)   1 % (0-3) 


 


Neutrophils # (Auto)


 


 17.6 x10^3uL


(1.8-7.7) 


 13.1 x10^3uL


(1.8-7.7)


 


Lymphocytes # (Auto)


 


 0.9 x10^3/uL


(1.0-4.8) 


 1.4 x10^3/uL


(1.0-4.8)


 


Monocytes # (Auto)


 


 2.2 x10^3/uL


(0.0-1.1) 


 1.5 x10^3/uL


(0.0-1.1)


 


Eosinophils # (Auto)


 


 0.0 x10^3/uL


(0.0-0.7) 


 0.0 x10^3/uL


(0.0-0.7)


 


Basophils # (Auto)


 


 0.0 x10^3/uL


(0.0-0.2) 


 0.1 x10^3/uL


(0.0-0.2)


 


Segmented Neutrophils %  75 % (35-66)   


 


Band Neutrophils %  7 % (0-9)   


 


Lymphocytes %  6 % (24-48)   


 


Monocytes %  12 % (0-10)   


 


Toxic Granulation  Slight   


 


Platelet Estimate


 


 Adequate


(ADEQUATE) 


 





 


Sodium Level


 


 138 mmol/L


(136-145) 


 137 mmol/L


(136-145)


 


Potassium Level


 


 4.1 mmol/L


(3.5-5.1) 


 4.2 mmol/L


(3.5-5.1)


 


Chloride Level


 


 101 mmol/L


() 


 104 mmol/L


()


 


Carbon Dioxide Level


 


 25 mmol/L


(21-32) 


 24 mmol/L


(21-32)


 


Anion Gap  12 (6-14)   9 (6-14) 


 


Blood Urea Nitrogen


 


 39 mg/dL


(8-26) 


 31 mg/dL


(8-26)


 


Creatinine


 


 2.2 mg/dL


(0.7-1.3) 


 1.8 mg/dL


(0.7-1.3)


 


Estimated GFR


(Cockcroft-Gault) 


 29.7 


 


 37.4 





 


BUN/Creatinine Ratio  18 (6-20)   


 


Glucose Level


 


 110 mg/dL


(70-99) 


 107 mg/dL


(70-99)


 


Calcium Level


 


 9.0 mg/dL


(8.5-10.1) 


 8.3 mg/dL


(8.5-10.1)


 


Magnesium Level


 


 1.9 mg/dL


(1.8-2.4) 


 





 


Total Bilirubin


 


 1.8 mg/dL


(0.2-1.0) 


 





 


Aspartate Amino Transf


(AST/SGOT) 


 16 U/L (15-37) 


 


 





 


Alanine Aminotransferase


(ALT/SGPT) 


 20 U/L (16-63) 


 


 





 


Alkaline Phosphatase


 


 70 U/L


() 


 





 


Troponin I Quantitative


 


 < 0.017 ng/mL


(0.000-0.055) 


 





 


NT-Pro-B-Type Natriuretic


Peptide 


 5531 pg/mL


(0-124) 


 





 


Total Protein


 


 7.3 g/dL


(6.4-8.2) 


 





 


Albumin


 


 3.2 g/dL


(3.4-5.0) 


 





 


Albumin/Globulin Ratio  0.8 (1.0-1.7)   


 


Thyroid Stimulating Hormone


(TSH) 


 1.746 uIU/mL


(0.358-3.74) 


 





 


Lactic Acid Level


 


 


 2.3 mmol/L


(0.4-2.0) 











Laboratory Tests








Test


 11/12/18


11:00 11/12/18


11:45 11/12/18


15:07 11/13/18


05:35


 


Urine Collection Type Unknown    


 


Urine Color Yellow    


 


Urine Clarity Turbid    


 


Urine pH 6.0    


 


Urine Specific Gravity 1.020    


 


Urine Protein


 30 mg/dL


(NEG-TRACE) 


 


 





 


Urine Glucose (UA)


 Negative mg/dL


(NEG) 


 


 





 


Urine Ketones (Stick)


 Negative mg/dL


(NEG) 


 


 





 


Urine Blood Moderate (NEG)    


 


Urine Nitrite Positive (NEG)    


 


Urine Bilirubin Negative (NEG)    


 


Urine Urobilinogen Dipstick


 1.0 mg/dL (0.2


mg/dL) 


 


 





 


Urine Leukocyte Esterase Large (NEG)    


 


Urine RBC Occ /HPF (0-2)    


 


Urine WBC >40 /HPF (0-4)    


 


Urine Squamous Epithelial


Cells Occ /LPF 


 


 


 





 


Urine Bacteria


 Many /HPF


(0-FEW) 


 


 





 


White Blood Count


 


 20.7 x10^3/uL


(4.0-11.0) 


 16.2 x10^3/uL


(4.0-11.0)


 


Red Blood Count


 


 4.50 x10^6/uL


(4.30-5.70) 


 3.71 x10^6/uL


(4.30-5.70)


 


Hemoglobin


 


 13.6 g/dL


(13.0-17.5) 


 11.3 g/dL


(13.0-17.5)


 


Hematocrit


 


 40.2 %


(39.0-53.0) 


 33.5 %


(39.0-53.0)


 


Mean Corpuscular Volume  89 fL ()   90 fL () 


 


Mean Corpuscular Hemoglobin  30 pg (25-35)   31 pg (25-35) 


 


Mean Corpuscular Hemoglobin


Concent 


 34 g/dL


(31-37) 


 34 g/dL


(31-37)


 


Red Cell Distribution Width


 


 14.9 %


(11.5-14.5) 


 15.2 %


(11.5-14.5)


 


Platelet Count


 


 232 x10^3/uL


(140-400) 


 166 x10^3/uL


(140-400)


 


Neutrophils (%) (Auto)  85 % (31-73)   81 % (31-73) 


 


Lymphocytes (%) (Auto)  4 % (24-48)   9 % (24-48) 


 


Monocytes (%) (Auto)  11 % (0-9)   10 % (0-9) 


 


Eosinophils (%) (Auto)  0 % (0-3)   0 % (0-3) 


 


Basophils (%) (Auto)  0 % (0-3)   1 % (0-3) 


 


Neutrophils # (Auto)


 


 17.6 x10^3uL


(1.8-7.7) 


 13.1 x10^3uL


(1.8-7.7)


 


Lymphocytes # (Auto)


 


 0.9 x10^3/uL


(1.0-4.8) 


 1.4 x10^3/uL


(1.0-4.8)


 


Monocytes # (Auto)


 


 2.2 x10^3/uL


(0.0-1.1) 


 1.5 x10^3/uL


(0.0-1.1)


 


Eosinophils # (Auto)


 


 0.0 x10^3/uL


(0.0-0.7) 


 0.0 x10^3/uL


(0.0-0.7)


 


Basophils # (Auto)


 


 0.0 x10^3/uL


(0.0-0.2) 


 0.1 x10^3/uL


(0.0-0.2)


 


Segmented Neutrophils %  75 % (35-66)   


 


Band Neutrophils %  7 % (0-9)   


 


Lymphocytes %  6 % (24-48)   


 


Monocytes %  12 % (0-10)   


 


Toxic Granulation  Slight   


 


Platelet Estimate


 


 Adequate


(ADEQUATE) 


 





 


Sodium Level


 


 138 mmol/L


(136-145) 


 137 mmol/L


(136-145)


 


Potassium Level


 


 4.1 mmol/L


(3.5-5.1) 


 4.2 mmol/L


(3.5-5.1)


 


Chloride Level


 


 101 mmol/L


() 


 104 mmol/L


()


 


Carbon Dioxide Level


 


 25 mmol/L


(21-32) 


 24 mmol/L


(21-32)


 


Anion Gap  12 (6-14)   9 (6-14) 


 


Blood Urea Nitrogen


 


 39 mg/dL


(8-26) 


 31 mg/dL


(8-26)


 


Creatinine


 


 2.2 mg/dL


(0.7-1.3) 


 1.8 mg/dL


(0.7-1.3)


 


Estimated GFR


(Cockcroft-Gault) 


 29.7 


 


 37.4 





 


BUN/Creatinine Ratio  18 (6-20)   


 


Glucose Level


 


 110 mg/dL


(70-99) 


 107 mg/dL


(70-99)


 


Calcium Level


 


 9.0 mg/dL


(8.5-10.1) 


 8.3 mg/dL


(8.5-10.1)


 


Magnesium Level


 


 1.9 mg/dL


(1.8-2.4) 


 





 


Total Bilirubin


 


 1.8 mg/dL


(0.2-1.0) 


 





 


Aspartate Amino Transf


(AST/SGOT) 


 16 U/L (15-37) 


 


 





 


Alanine Aminotransferase


(ALT/SGPT) 


 20 U/L (16-63) 


 


 





 


Alkaline Phosphatase


 


 70 U/L


() 


 





 


Troponin I Quantitative


 


 < 0.017 ng/mL


(0.000-0.055) 


 





 


NT-Pro-B-Type Natriuretic


Peptide 


 5531 pg/mL


(0-124) 


 





 


Total Protein


 


 7.3 g/dL


(6.4-8.2) 


 





 


Albumin


 


 3.2 g/dL


(3.4-5.0) 


 





 


Albumin/Globulin Ratio  0.8 (1.0-1.7)   


 


Thyroid Stimulating Hormone


(TSH) 


 1.746 uIU/mL


(0.358-3.74) 


 





 


Lactic Acid Level


 


 


 2.3 mmol/L


(0.4-2.0) 














Images


Images


 CT ABD/Pelvis:


Impression:


Prostatomegaly. No significant wall thickening of the urinary bladder. 


Urinary bladder is moderately distended. Urinary bladder diverticulum is 


small in size.





Assessment/Plan


Assessment/Plan


UTI: Continue antibiotics, await culture.  


BPH with urinary retention, output of 1000 ml initially: Nursing staff to 

maintain shaw catheter while in house.  Device will need to stay in place for 

one week total prior to voiding trial.  


If he does discharge prior to then, will need to go home  with shaw in place.


An appointment has been arranged for patient to have a voiding trial with Dr. Hidalgo of [facility] on 11/21/18 at 0940 am. Appointment card and paperwork given to 

patient.  All questions answered 


Discussed with Dr. Agarwal and attending RN. 


Will follow until discharge.











ADRIAN DUKE Nov 13, 2018 08:34

## 2018-11-15 NOTE — RAD
PA and lateral chest radiograph.

 

History:  Shortness of air.

 

Comparison:  November 12, 2018.

 

Findings: Cardiomediastinal silhouette is within normal limits for size.  

Median sternotomy wires are present.  No pneumothorax is seen.  There is 

mild blunting of both costophrenic angles on lateral views.  There is mild

accentuation of interstitial markings.  Emphysematous changes of lungs are

seen.  Multiple thoracic vertebral levels demonstrate marginal disc 

osteophytes.  Vertebroplasty cement is seen near the thoracolumbar 

junction.

 

Impression: 

1.  There is mild blunting of both costophrenic angles, could indicate 

pleural scarring versus small bilateral pleural effusions.

2.  No acute airspace disease identified.

3.  Emphysematous changes.  Mild accentuation of interstitial markings, 

could be mild associated fibrosis.

 

Electronically signed by: Jean Carlos Torres MD (11/15/2018 9:20 AM) [place] [id number]

## 2018-11-15 NOTE — CONS
DATE OF CONSULTATION:  



PULMONARY CONSULTATION



ATTENDING PHYSICIAN:  Dr. Agarwal.



REASON FOR CONSULTATION:  Dyspnea.



HISTORY OF PRESENT ILLNESS:  The patient is a 71-year-old male who was

hospitalized with fever and urinary tract infection and has been treated for E.

coli urinary tract infection.  The patient was going to be discharged home

today, but when Dr. Agarwal was making rounds, he noticed that the patient was

having significant exertional dyspnea.  As a result, his discharge was cancelled

and I have been asked to see him for further evaluation.



The patient has a significant cardiac history.  He has an EF of 35%.  He

underwent left cardiac catheterization in 01/2017.  Only 2 out of 5 bypass

grafts were patent.  He had unsuccessful recanalization of subacute chronic

total occlusion of the distal left circumflex stents.  The patient's chest x-ray

was done today and I have reviewed the chest x-ray and it shows evidence of mild

interstitial edema.  There were some tiny pleural effusions as well.  This was a

new finding compared to clear chest x-ray on 11/12/2018.  The patient stated

this all started after nebulizer treatments.  He started to cough and had

shortness of breath.  He feels better now.  I have been asked to see him for

further evaluation.  He has no chest pain.  He says the cough only started while

in the hospital after a nebulizer treatment.  No headaches.  No nausea or

vomiting.  No diarrhea.  He was currently being hydrated for his urinary tract

infection.



PAST MEDICAL HISTORY:  Significant for history of severe cardiomyopathy, EF of

35%; history of occluded grafts, 3 out of 5 and suspected COPD.  He smoked for

at least 48 years.  He has BPH, hypertension, MI and angioplasty and coronary

artery bypass and 6 stents.



PAST SURGICAL HISTORY:  As discussed above.



FAMILY HISTORY:  Diabetes and coronary artery disease.  Family history is

noncontributory to lungs.



SOCIAL HISTORY:  He is retired.  He quit tobacco when he was 60 years old, but

smoked for about 48 years.



REVIEW OF SYSTEMS:  Twelve-point systems obtained.  Pertinent positives

discussed in my history of present illness, otherwise noncontributory.  All

systems that were negative were reviewed as well.



ALLERGIES:  None.



MEDICATIONS:  His current medications were reviewed as listed in the MRAD.



PHYSICAL EXAMINATION:

VITAL SIGNS:  Reviewed.  He had a T-max of 99.5 yesterday.  Today, he is

afebrile.

HEENT:  Sclerae nonicteric.

NECK:  Supple.

LUNGS:  With few crackles at the bases.

CARDIOVASCULAR:  Regular rate and rhythm.

ABDOMEN:  Soft and nontender.

EXTREMITIES:  With trace pitting edema and some venous stasis.



LABORATORY DATA:  His labs were reviewed.  His white cell count on admission was

20.7 and then on 11/13/2018, it was 16.2.  Chemistries with a BUN and creatinine

of 18 and 1.4; it was 31 and 1.8 on admission.



IMPRESSION:

1.  Acute dyspnea secondary to development of mild interstitial edema in a

patient who has known cardiomyopathy with an ejection fraction of 35%.

2.  The patient with previous cardiac catheterization in 01/2017.  Only 2 out of

5 grafts were patent and had unsuccessful recanalization of subacute chronic

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

3.  Highly suspected chronic obstructive pulmonary disease, smoked for 48 years.

4.  Recent Escherichia coli urinary tract infection.



RECOMMENDATIONS:

1.  From a Pulmonary standpoint, I would stop the IV fluids and give one dose of

Lasix.

2.  Follow him clinically.

3.  Continue antibiotics per Dr. Agarwal's recommendation for Escherichia coli

urinary tract infection.

4.  I will follow white cell count to make sure the trend is going down.

5.  P.r.n. bronchodilators.

6.  Discussed with RN and discussed with Dr. Agarwal.  We will probably consider

keeping him one more day in the hospital.  PFTs as an outpatient.

 



______________________________

DEO FARRIS MD DR:  [email or web address]/[email or web address]  JOB#:  [id number] / [id number]

DD:  11/15/2018 09:41  DT:  11/15/2018 10:33

## 2018-11-28 NOTE — CARD
MR#: [id number]

Account#: [id number]

Accession#: [id number]

Date of Study: 2018

Ordering Physician: LAVON MARCOS, 

Referring Physician: LAVON MARCOS, 

Tech: Cynthia Ochoa NORMAN





--------------- APPROVED REPORT --------------





EXAM: Two-dimensional and M-mode echocardiogram with Doppler and color Doppler.



Other Information 

Quality : Technically LimitedHR: 64bpm

Rhythm : NSRTechnically limited study due to  CABG and emphysema.



INDICATION

Congestive Heart Failure 



Surgery/Intervention

CABD DIMENSIONS 

RVDd3.5 (2.9-3.5cm)Left Atrium(2D)4.1 (1.6-4.0cm)

IVSd1.5 (0.7-1.1cm)Aortic Root(2D)3.3 (2.0-3.7cm)

LVDd4.6 (3.9-5.9cm)LVOT Diameter2.0 (1.8-2.4cm)

PWd1.2 (0.7-1.1cm)LVDs3.3 (2.5-4.0cm)

LVEF(%)50.0 (>50%)



Aortic Valve

AoV Peak Nader.114.6cm/sAoV VTI23.8cm

AO Peak GR.5.3mmHgLVOT Peak Nader.109.3cm/s

AO Mean GR.3mmHgAVA (VMAX)3.13cm2

ANNETTE   (VTI)3.00cm2



Mitral Valve

MV E Isbwmlbs07.8cm/sMV DECEL [id number]

MV A Igccjjbz82.8cm/sE/A  Ratio0.6

MV A [id number]



Pulmonary Valve

PV Peak Scbnmzwp77.9cm/s



Tricuspid Valve

TR P. Rkywkbba195da/sRAP VKCCDMON5hmYl

TR Peak Gr.83zkAtFVNB20pbIp



 LEFT VENTRICLE 

The left ventricle is normal size. There is mild concentric left ventricular hypertrophy. Left ventri
junior ejection fraction is low normal. The Ejection Fraction is 50-55%. There is mild hypokinesis in th
e apical septal wall. Transmitral Doppler flow pattern is Grade I-abnormal relaxation pattern.



 RIGHT VENTRICLE 

The right ventricle is mildly dilated. There is normal right ventricular wall thickness. The right ve
ntricular systolic function is normal.



 ATRIA 

The left atrium is borderline dilated. The right atrium is mildly dilated. The interatrial septum is 
intact with no evidence for an atrial septal defect or patent foramen ovale as noted on 2-D or Dopple
r imaging.



 AORTIC VALVE 

The aortic valve is mildly calcified. The aortic valve is probably trileaflet. Doppler and Color Flow
 revealed no significant aortic regurgitation. There is no significant aortic valvular stenosis.



 MITRAL VALVE 

The mitral valve is normal in structure and function. There is no evidence of mitral valve prolapse. 
There is no mitral valve stenosis. Doppler and Color Flow revealed trace mitral valve regurgitation.



 TRICUSPID VALVE 

The tricuspid valve is normal in structure and function. Doppler and Color Flow revealed trace tricus
pid regurgitation. The PA pressure was estimated at 23 mmHg. There is no tricuspid valve prolapse or 
vegetation. There is no tricuspid valve stenosis.



 PULMONIC VALVE 

Pulmonic valve not well visualized.



 GREAT VESSELS 

The aortic root is normal in size.



 PERICARDIAL EFFUSION 

There is no evidence of significant pericardial effusion.



Critical Notification

Critical Value: No



<Conclusion>

The left ventricle is normal size.

Left ventricle ejection fraction is low normal.

The Ejection Fraction is 50-55%.

There is mild hypokinesis in the apical septal wall.

There is mild concentric left ventricular hypertrophy.

There is no significant aortic valvular stenosis.

Doppler and Color Flow revealed no significant aortic regurgitation.

Doppler and Color Flow revealed trace mitral valve regurgitation.

Doppler and Color Flow revealed trace tricuspid regurgitation.

The PA pressure was estimated at 23 mmHg.



Signed by : Floyd Gonzales MD

Electronically Approved : 2018 10:57:49

## 2018-12-05 NOTE — DISCH
DISCHARGE INSTRUCTIONS


Condition on Discharge


Condition on Discharge:  Stable





Activity After Discharge


Activity Instructions for Disc:  Progressive ambulation


Lifting Instructions after Dis:  Do not lift >10 pounds


Driving Instructions after Dis:  Do not drive


Weight Bearing Status after Di:  No restrictions





Diet after Discharge


Diet after Discharge:  Cardiac


Diet Texture:  Regular


Swallowing Supervision:  None needed





Wound Incision Care


Wound/Incision Care:  No wound care needed





Contacting the  after DC


Call your doctor for:  Concerns you may have





Follow-Up


Follow up with:  dr rader 2 weeks





Treatment/Equipment after DC


Adaptive Equipment Issued:  CATHI Adams MD Dec 5, 2018 17:27

## 2018-12-05 NOTE — PDOC4
OPERATIVE NOTE


Pre-Op Diagnosis:


bph w obstruction





Post-Op Diagnosis:


same





Procedure Performed:


cysto , greenlight laser PVP.





Surgeon:








Anesthesia Type:


ga





Blood Loss:


5ml





Specimans Obtained:


none





Findings:


low to moderate lateral obstruction.


mild bladder neck elevation





Complications:


none evident











CATHI RODRIGUEZ MD Dec 5, 2018 13:26

## 2018-12-05 NOTE — EKG
[facility]

               [address] [place] [address]

Test Date:    2018               Test Time:    13:37:15

Pat Name:     NICOLLE GUEVARA              Department:   

Patient ID:   [id number]           Room:          

Gender:       M                        Technician:   MB

:          1947               Requested By: CAT SIMENTAL

Order Number: [id number]           Reading MD:     

                                 Measurements

Intervals                              Axis          

Rate:         67                       P:            53

NM:           180                      QRS:          -32

QRSD:         76                       T:            55

QT:           436                                    

QTc:          464                                    

                           Interpretive Statements

SINUS RHYTHM

ABNORMAL LEFT AXIS DEVIATION

R-S TRANSITION ZONE IN V LEADS DISPLACED TO THE RIGHT

LOW LIMB LEAD VOLTAGE

QRS(T) CONTOUR ABNORMALITY

CONSISTENT WITH INFERIOR INFARCT

PROBABLY OLD

ABNORMAL ECG

RI6.01

Compared to ECG 2018 11:58:09

Left-axis deviation now present

Myocardial infarct finding now present

## 2018-12-05 NOTE — OP
DATE OF SURGERY:  12/05/2018



PREOPERATIVE DIAGNOSES:  Benign prostatic hypertrophy with obstruction.



POSTOPERATIVE DIAGNOSES:  Benign prostatic hypertrophy with obstruction.



PROCEDURE:  Cystoscopy with laser GreenLight for vaporization of the prostate.



SURGEON:  Shea Hidalgo MD.



ANESTHESIA:  General.



CONDITION:  Stable.



COMPLICATIONS:  None.



ESTIMATED BLOOD LOSS:  5 mL.



FINDINGS:  Low to moderate obstruction from lateral lobe, slightly elevated

bladder neck versus small median lobe.  Ureteral orifices were away from the

area of vaporization.



No evidence of anterior or posterior urethral stricture disease.



DESCRIPTION OF DETAIL:  The patient was taken back to the procedure room and

placed under general anesthesia in supine position per the protocol.  He was

prepped and draped in the usual sterile fashion in dorsal lithotomy position. 

Timeout was performed.  SCDs were attached.  IV antibiotics were administered.



Previously, his urethral catheter was removed before he was prepped.



A 23-Ugandan continuous cystoscope was placed with the MoXy XPS laser fiber in

the working channel.  I first marked my area of proximal and distal limit of

dissection with a setting of 80.  The rest of the prostate was opened up with a

setting of 120 nicole with continuous overlaps and swipes.  With the solution

turned off upon parking at the [employer], he had a wide open prostate fossa

in a shape of wine barrel.



Pinpoint hemostasis was obtained.  Cystoscope was removed and a 20-Ugandan

catheter was placed per urethra into the bladder.  Irrigation showed no

significant bleeding.  The patient was awakened and taken to the PACU in stable

condition.  We will admit postoperative for just a 23-hour observation as per

Cardiology recommendations.

 



______________________________

SHEA HIDALGO MD



DR:  MB/nts  JOB#:  [id number] / [id number]

DD:  12/05/2018 13:32  DT:  12/05/2018 14:10

## 2018-12-06 NOTE — PDOC2
CONSULT


Date of Consult


Date of Consult


DATE: 18 


TIME: 13:47





Reason for Consult


Reason for Consult:


Significant History of Cardiac Disease





Referring Physician


Referring Physician:


Dr. Shea Hidalgo





Identification/Chief Complaint


Chief Complaint


Urinary Retention





Source


Source:  Caregiver, Patient





History of Present Illness


Reason for Visit:


Abdiaziz Jon is a 70 y/o  male with a past medical history of several 

cardiac bypasses, and 3 myocardial infarctions  (most recent in ), 

presenting with urinary retention.





On , patient was admitted to the [place] ED for urinary 

retention. He was incapable of maintaining a urinary stream, although felt the 

urge to go frequently multiple times. He then tried to meet up with his primary 

care doctor, but could not get an appointment. He then had  a brief two day 

hospitalization, and then was released. He had surgery for his BPH yesterday, 

and said that the urologist was keeping him here until he could empty his 

bladder to 250 ccs of fluid, but he has not been able to reach that quantity, 

as he is continually making more urine. He has been urinating frequently, but 

in low quantity.





Cardiac History: He said that some of his stents have failed, and that some of 

his heart issues are due to this failure, where "half of his cardiac muscle has 

". He has had two bypass surgeries, with one where his stents have failed, 

as well as having 3 heart attacks, with one directly proceeding after his hip 

surgery.





He denies any cardiac issues currently.





Past Medical History


Cardiovascular:  CAD, CHF, HTN, Hyperlipidemia


Pulmonary:  COPD


CENTRAL NERVOUS SYSTEM:  Other


GI:  GERD


Heme/Onc:  No pertinent hx


Hepatobiliary:  No pertinent hx


Psych:  No pertinent hx


Rheumatologic:  No pertinent hx


Infectious disease:  No pertinent hx


Renal/:  Chronic renal insuff, Benign prostatic enlarg.


Endocrine:  No pertinent hx





Past Surgical History


Past Surgical History:  CABG, Other





Family History


Family History:  Hypertension





Social History


ALCOHOL:  none


Drugs:  None


Lives:  with Family





Current Medications


Current Medications





Current Medications


Prochlorperazine Edisylate (Compazine) 5 mg PACU PRN  PRN IV NAUSEA, MRX1;  

Start 18 at 07:00;  Stop 18 at 06:59;  Status DC


Hydromorphone HCl (Dilaudid) 0.5 mg PRN Q10MIN  PRN IV SEV PAIN, Second choice;

  Start 18 at 07:00;  Stop 18 at 06:59;  Status DC


Lidocaine HCl (Xylocaine-Mpf 1% 2ml Vial) 2 ml PRN 1X  PRN ID IV START;  Start 

18 at 07:00;  Stop 18 at 06:59;  Status DC


Ringer's Solution 1,000 ml @  30 mls/hr Q24H IV ;  Start 18 at 07:00;  

Stop 18 at 18:59;  Status DC


Morphine Sulfate (Morphine Sulfate) 1 mg PRN Q10MIN  PRN IV SEVERE PAIN;  Start 

18 at 07:00;  Stop 18 at 06:59;  Status DC


Fentanyl Citrate (Fentanyl 2ml Vial) 50 mcg PRN Q5MIN  PRN IV MODERATE TO 

SEVERE PAIN;  Start 18 at 07:00;  Stop 18 at 06:59;  Status DC


Fentanyl Citrate (Fentanyl 2ml Vial) 25 mcg PRN Q5MIN  PRN IV MILD PAIN;  Start 

18 at 07:00;  Stop 18 at 06:59;  Status DC


Ondansetron HCl (Zofran) 4 mg PRN Q6HRS  PRN IV NAUSEA/VOMITING;  Start 18 

at 07:00;  Stop 18 at 06:59;  Status DC


Ciprofloxacin/ Dextrose 200 ml @  200 mls/hr PREOP 1X  PRN IV PRE-OP Last 

administered on 18at 12:43;  Start 18 at 06:00;  Stop 18 at 12:56

;  Status DC


Propofol 20 ml @ As Directed STK-MED ONCE IV ;  Start 18 at 11:24;  Stop  at 11:25;  Status DC


Lidocaine HCl (Lidocaine Pf 2% Vial) 5 ml STK-MED ONCE .ROUTE ;  Start 18 

at 11:24;  Stop 18 at 11:25;  Status DC


Fentanyl Citrate (Fentanyl 2ml Vial) 100 mcg STK-MED ONCE .ROUTE ;  Start  at 11:24;  Stop 18 at 11:25;  Status DC


Phenylephrine HCl (PHENYLEPHRINE in 0.9% NACL PF) 1 mg STK-MED ONCE IV ;  Start 

18 at 12:53;  Stop 18 at 12:54;  Status DC


Dexamethasone Sodium Phosphate (Decadron) 20 mg STK-MED ONCE .ROUTE ;  Start 18 at 12:53;  Stop 18 at 12:54;  Status DC


Ondansetron HCl (Zofran) 4 mg STK-MED ONCE .ROUTE ;  Start 18 at 12:53;  

Stop 18 at 12:54;  Status DC


Ephedrine Sulfate (Akovaz) 50 mg STK-MED ONCE .ROUTE ;  Start 18 at 13:17;

  Stop 18 at 13:18;  Status DC


Aspirin (Ecotrin) 325 mg DAILY PO  Last administered on 18at 09:02;  Start 

18 at 09:00


Atorvastatin Calcium (Lipitor) 20 mg QHS PO  Last administered on 18at 21:

10;  Start 18 at 21:00


Furosemide (Lasix) 20 mg DAILY PO  Last administered on 18at 09:02;  Start 

18 at 09:00


Metoprolol Succinate (Toprol Xl) 25 mg HS PO  Last administered on 18at 21:

10;  Start 18 at 21:00


Pantoprazole Sodium (Protonix) 40 mg DAILY06 PO  Last administered on 18at 

06:08;  Start 18 at 06:00


Tamsulosin HCl (Flomax) 0.4 mg DAILY PO  Last administered on 18at 09:01;  

Start 18 at 09:00


Non-Formulary Medication (Budesonide/ Formoterol Fumarate (Symbicort 80-4.5 Mcg 

Inhaler)) 1 puff BID IH ;  Start 18 at 21:00;  Status UNV


Lisinopril (Prinivil) 2.5 mg DAILY PO  Last administered on 18at 09:03;  

Start 18 at 09:00


Phenazopyridine HCl (Pyridium) 200 mg TID PO  Last administered on 18at 09:

02;  Start 18 at 14:00


Naloxone HCl (Narcan) 0.1 mg PRN Q2MIN  PRN IV SEE COMMENTS;  Start 18 at 

13:30


Sodium Chloride (Normal Saline Flush) 3 ml QSHIFT  PRN IV AFTER MEDS AND BLOOD 

DRAWS;  Start 18 at 13:30


Ringer's Solution 1,000 ml @  100 mls/hr Q10H IV ;  Start 18 at 13:26


Acetaminophen/ Hydrocodone Bitart (Lortab 5/325) 1 tab PRN Q4HRS  PRN PO MILD 

PAIN Last administered on 18at 19:11;  Start 18 at 13:30


Ondansetron HCl (Zofran) 4 mg PRN Q6HRS  PRN IV NAUESA, 1ST CHOICE;  Start  at 13:30


Docusate Sodium (Colace) 100 mg BID PO  Last administered on 18at 09:02;  

Start 18 at 21:00


Albuterol Sulfate (Ventolin Neb Soln) 2.5 mg Q6HRS NEB  Last administered on 18at 12:32;  Start 18 at 14:20


Budesonide (Pulmicort) 0.5 mg RTBID NEB  Last administered on 18at 06:46;  

Start 18 at 20:00





Active Scripts


Active


Lipitor (Atorvastatin Calcium) 20 Mg Tablet 20 Mg PO QHS


Reported


Symbicort 80-4.5 Mcg Inhaler (Budesonide/Formoterol Fumarate) 10.2 Gm 

Hfa.aer.ad 1 Puff IH BID


Furosemide 20 Mg Tablet 20 Mg PO DAILY


Tamsulosin Hcl 0.4 Mg Cap.er.24h 0.4 Mg PO DAILY


Lisinopril 2.5 Mg Tablet 5 Mg PO DAILY


Metoprolol Succinate ( Xl ) (Metoprolol Succinate) 25 Mg Tab.er.24h 25 Mg PO HS


Protonix (Pantoprazole Sodium) 40 Mg Tablet.dr 40 Mg PO DAILY


Aspirin Ec (Aspirin) 325 Mg Tablet.dr 1 Tab PO DAILY





Allergies


Allergies:  


Coded Allergies:  


     No Known Drug Allergies (Unverified , 18)





Physical Exam


General:  Alert, Oriented X3, Cooperative


HEENT:  Atraumatic, Mucous membr. moist/pink


Lungs:  Clear to auscultation, Normal air movement


Heart:  Regular rate, Normal S1, Normal S2


Neuro:  Normal speech


Psych/Mental Status:  Mental status NL, Mood NL





Vitals


VITALS





Vital Signs








  Date Time  Temp Pulse Resp B/P (MAP) Pulse Ox O2 Delivery O2 Flow Rate FiO2


 


18 12:33      Room Air  


 


18 11:27 97.5 69 20 112/66 (81) 95   





 97.5       


 


18 13:28       10 











Assessment/Plan


Assessment/Plan


Abdiaziz Jon is a 70 y/o  male with a past medical history of several 

cardiac bypasses, and 3 myocardial infarctions  (most recent in ), 

presenting with urinary retention.





Patient is compensated from a cardiac perspective, Agree with current treatment 

plan.





This pt has seen Dr Alexis in the past.





If he needs further cardiac care I would suggest to change the consult to Dr Alexis otherwise pt may be discharged





There will not be a charge from me to this pt





Thank you for asking me to see this patient.











CAT SIMENTAL MD Dec 6, 2018 13:48

## 2018-12-06 NOTE — PDOC
PROGRESS NOTE


SUBJECTIVE:


ROS:


ROS:





RESPIRATORY: Shortness of breath denies. Cough denies.


UROLOGY: Denies blood in urine. Denies difficulty urinating


HPI:


Voiding since shaw removed, getting easier to void each time, he reports 

stronger stream than he has had in a long time.


-450 ml today, voding 175 - 350 ml at a time.


No dysuria.


No other complaints.





OBJECTIVE:


Vital Signs:





Vital Signs








  Date Time  Temp Pulse Resp B/P (MAP) Pulse Ox O2 Delivery O2 Flow Rate FiO2


 


12/6/18 12:33      Room Air  


 


12/6/18 11:27 97.5 69 20 112/66 (81) 95 Room Air  





 97.5       


 


12/6/18 09:03  68  125/64    


 


12/6/18 08:00      Room Air  


 


12/6/18 07:00 97.5 68 18 125/64 (84) 94 Room Air  





 97.5       


 


12/6/18 06:47     93 Room Air  


 


12/6/18 03:00 98.1 59 16 108/67 (81) 97 Room Air  





 98.1       


 


12/6/18 01:27      Room Air  


 


12/5/18 23:00 97.8 65 16 95/57 (70) 94 Room Air  





 97.8       


 


12/5/18 21:10  78  102/65    


 


12/5/18 20:30      Room Air  


 


12/5/18 20:22     97 Room Air  


 


12/5/18 20:15   16   Room Air  


 


12/5/18 19:11     95 Room Air  


 


12/5/18 18:00  82  106/61 (76) 95 Room Air  


 


12/5/18 17:45      Room Air  


 


12/5/18 17:00  79  113/67 (82) 95 Room Air  


 


12/5/18 16:00  76  114/60 (78) 96 Room Air  


 


12/5/18 15:30  80   95 Room Air  


 


12/5/18 15:00  69   97 Room Air  


 


12/5/18 14:45  63  131/80 (97) 97 Room Air  


 


12/5/18 14:30  68  128/69 (88) 97 Room Air  


 


12/5/18 14:15 97.7 64 18 136/92 (107) 98 Room Air  





 97.7       


 


12/5/18 13:58 97.7 63 16 118/69 96 Room Air  





 97.7       








I & O











Intake and Output 


 


 12/6/18





 07:00


 


Intake Total 1400 ml


 


Output Total 1200 ml


 


Balance 200 ml


 


 


 


Intake Oral 800 ml


 


IV Total 600 ml


 


Output Urine Total 1200 ml











PHYSICAL EXAM:


Physical Exam:


General: Pleasant, no acute distress, well groomed


Respiratory: unlabored breathing


Cardiovascular: no peripheral edema


Abdomen: nontender, nondistended, no hepatosplenomegaly, no masses


Psych: normal mood, affect. Alert and oriented x 3.


Voided urine clear yellow





MEDICATIONS:





Current Medications








 Medications


  (Trade)  Dose


 Ordered  Sig/Claude  Start Time


 Stop Time Status Last Admin


Dose Admin


 


 Acetaminophen/


 Hydrocodone Bitart


  (Lortab 5/325)  1 tab  PRN Q4HRS  PRN  12/5/18 13:30


    12/5/18 19:11


1 TAB


 


 Albuterol Sulfate


  (Ventolin Neb


 Soln)  2.5 mg  Q6HRS  12/5/18 14:20


    12/6/18 12:32


2.5 MG


 


 Aspirin


  (Ecotrin)  325 mg  DAILY  12/6/18 09:00


    12/6/18 09:02


325 MG


 


 Atorvastatin


 Calcium


  (Lipitor)  20 mg  QHS  12/5/18 21:00


    12/5/18 21:10


20 MG


 


 Budesonide


  (Pulmicort)  0.5 mg  RTBID  12/5/18 20:00


    12/6/18 06:46


0.5 MG


 


 Ciprofloxacin/


 Dextrose  200 ml @ 


 200 mls/hr  PREOP 1X  PRN  12/5/18 06:00


 12/5/18 12:56 DC 12/5/18 12:43


200 MLS/HR


 


 Dexamethasone


 Sodium Phosphate


  (Decadron)  20 mg  STK-MED ONCE  12/5/18 12:53


 12/5/18 12:54 DC  





 


 Docusate Sodium


  (Colace)  100 mg  BID  12/5/18 21:00


    12/6/18 09:02


100 MG


 


 Ephedrine Sulfate


  (Akovaz)  50 mg  STK-MED ONCE  12/5/18 13:17


 12/5/18 13:18 DC  





 


 Fentanyl Citrate


  (Fentanyl 2ml


 Vial)  100 mcg  STK-MED ONCE  12/5/18 11:24


 12/5/18 11:25 DC  





 


 Furosemide


  (Lasix)  20 mg  DAILY  12/6/18 09:00


    12/6/18 09:02


20 MG


 


 Hydromorphone HCl


  (Dilaudid)  0.5 mg  PRN Q10MIN  PRN  12/5/18 07:00


 12/6/18 06:59 DC  





 


 Lidocaine HCl


  (Lidocaine Pf 2%


 Vial)  5 ml  STK-MED ONCE  12/5/18 11:24


 12/5/18 11:25 DC  





 


 Lidocaine HCl


  (Xylocaine-Mpf


 1% 2ml Vial)  2 ml  PRN 1X  PRN  12/5/18 07:00


 12/6/18 06:59 DC  





 


 Lisinopril


  (Prinivil)  2.5 mg  DAILY  12/6/18 09:00


    12/6/18 09:03


2.5 MG


 


 Metoprolol


 Succinate


  (Toprol Xl)  25 mg  HS  12/5/18 21:00


    12/5/18 21:10


25 MG


 


 Morphine Sulfate


  (Morphine


 Sulfate)  1 mg  PRN Q10MIN  PRN  12/5/18 07:00


 12/6/18 06:59 DC  





 


 Naloxone HCl


  (Narcan)  0.1 mg  PRN Q2MIN  PRN  12/5/18 13:30


     





 


 Non-Formulary


 Medication


  (Budesonide/


 Formoterol


 Fumarate


  (Symbicort 80-4.5


 Mcg Inhaler))  1 puff  BID  12/5/18 21:00


   UNV  





 


 Ondansetron HCl


  (Zofran)  4 mg  PRN Q6HRS  PRN  12/5/18 13:30


     





 


 Pantoprazole


 Sodium


  (Protonix)  40 mg  DAILY06  12/6/18 06:00


    12/6/18 06:08


40 MG


 


 Phenazopyridine


 HCl


  (Pyridium)  200 mg  TID  12/5/18 14:00


    12/6/18 09:02


200 MG


 


 Phenylephrine HCl


  (PHENYLEPHRINE


 in 0.9% NACL PF)  1 mg  STK-MED ONCE  12/5/18 12:53


 12/5/18 12:54 DC  





 


 Prochlorperazine


 Edisylate


  (Compazine)  5 mg  PACU PRN  PRN  12/5/18 07:00


 12/6/18 06:59 DC  





 


 Propofol  20 ml @ As


 Directed  STK-MED ONCE  12/5/18 11:24


 12/5/18 11:25 DC  





 


 Ringer's Solution  1,000 ml @ 


 100 mls/hr  Q10H  12/5/18 13:26


     





 


 Sodium Chloride


  (Normal Saline


 Flush)  3 ml  QSHIFT  PRN  12/5/18 13:30


     





 


 Tamsulosin HCl


  (Flomax)  0.4 mg  DAILY  12/6/18 09:00


    12/6/18 09:01


0.4 MG











ASSESSMENT & PLAN


POD # 1 Greenlight laser of prostate.


Voiding ok. d/c to home later today if ok with cardiology


f/u with / Gwen 1-2 weeks











CRYSTAL CORREA MD Dec 6, 2018 13:58

## 2018-12-06 NOTE — DS
DATE OF DISCHARGE:  12/06/2018



ADMITTING DIAGNOSES:  Benign prostatic hypertrophy with obstruction.



PROCEDURE PERFORMED:  Cystoscopy with GreenLight laser vaporization of the

prostate.



HOSPITAL COURSE:  The patient underwent GreenLight laser vaporization of the

prostate by Dr. Hidalgo on 12/05/2018.  The procedure was uncomplicated.  His

postoperative course was also uncomplicated, he was voiding without difficulty

on postoperative day 1 and with no hematuria.  Cardiology was consulted for

management of his chronic heart disease.  He had no worsening of his heart

disease while in the hospital.  He was discharged home on postoperative day 1.



DISCHARGE MEDICATIONS:  Continue all home medications including aspirin 325 mg

daily, Lipitor 20 mg at night, Symbicort inhaler 1 puff twice per day,

furosemide 20 mg daily, lisinopril 5 mg daily, metoprolol 25 mg daily, Protonix

40 mg daily and tamsulosin 0.4 mg daily.



DISCHARGE DIET:  Cardiac.



ACTIVITY:  Light activity.



FOLLOWUP:  With Dr. Hidalgo in 2 weeks.



CONDITION ON DISCHARGE:  Stable.

 



______________________________

CRYSTAL CORREA MD



DR:  [email or web address]  JOB#:  [id number] / [id number]

DD:  12/06/2018 14:02  DT:  12/06/2018 14:30

## 2022-12-12 NOTE — PDOC
PROGRESS NOTES


Chief Complaint


Chief Complaint


Urinary Retention and SOA





History of Present Illness


History of Present Illness


Pt was examined at bedside sitting up, he has increased work of breathing and 

complaining of soa. Pt was afebrile overnight, shaw catheter still in place 

and IV fluids hanging, cipro antibiotics IV on board. 


CECILY pt and son in law concerning soa, called pulmonology on phone and they 

advised stat CXR to check for possible pneumonia before discharging.  


 Chest CT (11/13) showed Bibasilar scar or atelectasis with diffuse bronchial 

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 

Indeterminate small low-density nodules in the liver, stable from prior exam.


[facility] NP on 11/13, she stated that they intend to keep the shaw catheter 

in place for 1 week, at which time the patient is scheduled to follow up at the 

urology clinic, they plan to do a TURP as some point in the near future.


DW patient leaving the shaw in for 1 week, and the probable procedure he will 

undergo with urology to resolve the prostatomegaly.





Vitals


Vitals





Vital Signs








  Date Time  Temp Pulse Resp B/P (MAP) Pulse Ox O2 Delivery O2 Flow Rate FiO2


 


11/15/18 09:40  69  142/79    


 


11/15/18 08:00      Room Air  


 


11/15/18 07:15 97.8  17  97   





 97.8       











Physical Exam


General:  Alert, Oriented X3, mild distress


Heart:  Regular rate, Normal S1, Normal S2


Lungs:  Clear, Other (no wheezes appreciated on ascultation but did have 

increased work of breathing.)


Abdomen:  Normal bowel sounds, Soft


Extremities:  No clubbing


Skin:  No rashes





Labs


LABS





Laboratory Tests








Test


 11/15/18


02:50


 


Sodium Level


 139 mmol/L


(136-145)


 


Potassium Level


 4.2 mmol/L


(3.5-5.1)


 


Chloride Level


 108 mmol/L


()


 


Carbon Dioxide Level


 21 mmol/L


(21-32)


 


Anion Gap 10 (6-14) 


 


Blood Urea Nitrogen


 18 mg/dL


(8-26)


 


Creatinine


 1.4 mg/dL


(0.7-1.3)


 


Estimated GFR


(Cockcroft-Gault) 50.0 





 


Glucose Level


 104 mg/dL


(70-99)


 


Calcium Level


 8.2 mg/dL


(8.5-10.1)


 


Magnesium Level


 1.8 mg/dL


(1.8-2.4)











Review of Systems


Review of Systems


General: Denies fever, night sweats, chills


Cardio: Denies chest pain, palpitations


Pulm: Admits soa,





Assessment and Plan


Assessmemt and Plan


Assessment


Acute on chronic renal failure


Acute Pyelonephritis


Chronic shortness of breath


Leukocytosis 


Shaw 





Plan


Probable discharge today if CXR is normal and patients breathing improves


CXR stat- possible pneumonia


Ciprofloxacin


CBC- trend wbc 


Shaw Catheter maintenance


Consult Pulmonology Dr. Roberts


Appreciate urology input


DVT prophylaxis


PT/OT


Continue Home meds











Comment


Review of Relevant


I have reviewed the following items joel (where applicable) has been applied.


Labs





Laboratory Tests








Test


 11/14/18


05:25 11/15/18


02:50


 


Sodium Level


 137 mmol/L


(136-145) 139 mmol/L


(136-145)


 


Potassium Level


 4.2 mmol/L


(3.5-5.1) 4.2 mmol/L


(3.5-5.1)


 


Chloride Level


 106 mmol/L


() 108 mmol/L


()


 


Carbon Dioxide Level


 22 mmol/L


(21-32) 21 mmol/L


(21-32)


 


Anion Gap 9 (6-14)  10 (6-14) 


 


Blood Urea Nitrogen


 24 mg/dL


(8-26) 18 mg/dL


(8-26)


 


Creatinine


 1.7 mg/dL


(0.7-1.3) 1.4 mg/dL


(0.7-1.3)


 


Estimated GFR


(Cockcroft-Gault) 39.9 


 50.0 





 


Glucose Level


 102 mg/dL


(70-99) 104 mg/dL


(70-99)


 


Calcium Level


 8.1 mg/dL


(8.5-10.1) 8.2 mg/dL


(8.5-10.1)


 


Magnesium Level


 1.8 mg/dL


(1.8-2.4) 1.8 mg/dL


(1.8-2.4)








Laboratory Tests








Test


 11/15/18


02:50


 


Sodium Level


 139 mmol/L


(136-145)


 


Potassium Level


 4.2 mmol/L


(3.5-5.1)


 


Chloride Level


 108 mmol/L


()


 


Carbon Dioxide Level


 21 mmol/L


(21-32)


 


Anion Gap 10 (6-14) 


 


Blood Urea Nitrogen


 18 mg/dL


(8-26)


 


Creatinine


 1.4 mg/dL


(0.7-1.3)


 


Estimated GFR


(Cockcroft-Gault) 50.0 





 


Glucose Level


 104 mg/dL


(70-99)


 


Calcium Level


 8.2 mg/dL


(8.5-10.1)


 


Magnesium Level


 1.8 mg/dL


(1.8-2.4)








Microbiology


11/12/18 Urine Culture - Final, Complete


           


11/12/18 Urine Culture Result 1 (SATYA) - Final, Complete


           


11/12/18 Antimicrobic Susceptibility - Final, Complete


Medications





Current Medications


Tamsulosin HCl (Flomax) 0.4 mg 1X  ONCE PO  Last administered on 11/12/18at 11:

50;  Start 11/12/18 at 11:00;  Stop 11/12/18 at 11:01;  Status DC


Albuterol/ Ipratropium (Duoneb) 3 ml 1X  ONCE NEB  Last administered on 11/12/ 18at 11:33;  Start 11/12/18 at 11:00;  Stop 11/12/18 at 11:01;  Status DC


Acetaminophen/ Hydrocodone Bitart (Lortab 5/325) 1 tab 1X  ONCE PO  Last 

administered on 11/12/18at 11:51;  Start 11/12/18 at 11:30;  Stop 11/12/18 at 11

:33;  Status DC


Ceftriaxone Sodium 50 ml @  100 mls/hr 1X  ONCE IV  Last administered on 11/12/ 18at 12:15;  Start 11/12/18 at 12:15;  Stop 11/12/18 at 12:44;  Status DC


Morphine Sulfate (Morphine Sulfate) 5 mg 1X  ONCE IV  Last administered on 11/12 /18at 13:05;  Start 11/12/18 at 13:15;  Stop 11/12/18 at 13:16;  Status DC


Sodium Chloride 1,000 ml @  1,000 mls/hr 1X  ONCE IV  Last administered on 11/12 /18at 14:31;  Start 11/12/18 at 14:30;  Stop 11/12/18 at 15:29;  Status DC


Ondansetron HCl (Zofran) 4 mg PRN Q8HRS  PRN IV NAUSEA/VOMITING Last 

administered on 11/12/18at 21:11;  Start 11/12/18 at 14:30;  Stop 11/13/18 at 14

:29;  Status DC


Morphine Sulfate (Morphine Sulfate) 4 mg PRN Q2HR  PRN IV PAIN;  Start 11/12/18 

at 14:30;  Stop 11/13/18 at 14:29;  Status DC


Acetaminophen (Tylenol) 650 mg PRN Q4HRS  PRN PO FEVER;  Start 11/12/18 at 14:30

;  Stop 11/13/18 at 14:29;  Status DC


Sodium Chloride 1,000 ml @  100 mls/hr Q10H IV  Last administered on 11/14/18at 

20:16;  Start 11/12/18 at 14:30


Ciprofloxacin/ Dextrose 200 ml @  200 mls/hr Q12HR IV  Last administered on 11/

15/18at 09:42;  Start 11/12/18 at 21:00


Tamsulosin HCl (Flomax) 0.4 mg DAILY PO  Last administered on 11/15/18at 09:43;

  Start 11/13/18 at 09:00


Ondansetron HCl (Zofran) 4 mg 1X  ONCE IV  Last administered on 11/12/18at 14:31

;  Start 11/12/18 at 14:30;  Stop 11/12/18 at 14:31;  Status DC


Aspirin (Ecotrin) 325 mg DAILY PO  Last administered on 11/15/18at 09:41;  

Start 11/13/18 at 09:00


Atorvastatin Calcium (Lipitor) 20 mg QHS PO  Last administered on 11/14/18at 20:

03;  Start 11/12/18 at 21:00


Metoprolol Succinate (Toprol Xl) 25 mg HS PO  Last administered on 11/14/18at 20

:04;  Start 11/12/18 at 21:00


Pantoprazole Sodium (Protonix) 40 mg DAILYAC PO  Last administered on 11/15/

18at 09:42;  Start 11/13/18 at 07:30


Lisinopril (Prinivil) 2.5 mg DAILY PO  Last administered on 11/15/18at 09:40;  

Start 11/13/18 at 09:00


Oxycodone/ Acetaminophen (Percocet 5/325) 2 tab PRN Q4HRS  PRN PO MODERATE PAIN

, 2ND CHOICE Last administered on 11/12/18at 20:30;  Start 11/12/18 at 17:45


Tamsulosin HCl (Flomax) 0.4 mg QHS PO  Last administered on 11/14/18at 20:04;  

Start 11/12/18 at 21:00


Sodium Chloride 1,000 ml @  75 mls/hr [id number] IV ;  Start 11/12/18 at 17:45;  

Stop 11/12/18 at 19:12;  Status DC


Promethazine HCl/ Codeine (Phenergan With Codeine Oral Syrup) 5 ml PRN Q4HRS  

PRN PO COUGH Last administered on 11/13/18at 20:21;  Start 11/12/18 at 20:45


Promethazine HCl/ Codeine (Phenergan With Codeine Oral Syrup) 10 ml PRN Q4HRS  

PRN PO COUGH;  Start 11/12/18 at 20:45


Sodium Chloride 500 ml @  500 mls/hr 1X  ONCE IV  Last administered on 11/13/ 18at 05:19;  Start 11/13/18 at 05:30;  Stop 11/13/18 at 06:29;  Status DC


Influenza Virus Vaccine (Afluria Trivalent [id number] Syringe) 0.5 ml ONCE ONCE 

VAX IM  Last administered on 11/13/18at 09:45;  Start 11/13/18 at 11:00;  Stop 

11/13/18 at 11:01;  Status DC


Lactobacillus Rhamnosus (Culturelle) 1 cap BID PO  Last administered on 11/15/

18at 09:41;  Start 11/13/18 at 21:00


Sodium Chloride 500 ml @  500 mls/hr 1X  ONCE IV  Last administered on 11/13/ 18at 12:03;  Start 11/13/18 at 12:00;  Stop 11/13/18 at 12:59;  Status DC


Zolpidem Tartrate (Ambien) 5 mg PRN QHS  PRN PO INSOMNIA Last administered on 11 /14/18at 20:04;  Start 11/13/18 at 21:15


Acetaminophen (Tylenol) 650 mg PRN Q4HRS  PRN PO FEVER Last administered on 11/ 14/18at 20:03;  Start 11/13/18 at 23:45


Furosemide (Lasix) 40 mg 1X  ONCE IVP  Last administered on 11/15/18at 09:46;  

Start 11/15/18 at 09:45;  Stop 11/15/18 at 09:46;  Status DC





Active Scripts


Active


[Oxycodone Hcl/Acetaminophen] 1 TAB Tablet 2 Tab PO PRN Q4HRS PRN


Plavix (Clopidogrel Bisulfate) 75 Mg Tablet 75 Mg PO [id number]


Lipitor (Atorvastatin Calcium) 20 Mg Tablet 20 Mg PO QHS


Reported


Lisinopril 2.5 Mg Tablet 2.5 Mg PO DAILY


Metoprolol Succinate ( Xl ) (Metoprolol Succinate) 25 Mg Tab.er.24h 25 Mg PO HS


Protonix (Pantoprazole Sodium) 40 Mg Tablet.dr 40 Mg PO DAILY


Oxycodone-Acetaminophen 5-325 (Oxycodone Hcl/Acetaminophen) 1 Each Tablet 2 

Each PO Q6HRS PRN


Aspirin Ec (Aspirin) 325 Mg Tablet.dr 1 Tab PO DAILY


Vitals/I & O





Vital Sign - Last 24 Hours








 11/14/18 11/14/18 11/14/18 11/14/18





 10:53 15:00 19:30 20:00


 


Temp 97.7 99.1 99.5 





 97.7 99.1 99.5 


 


Pulse 60 72 78 


 


Resp 18 20 20 


 


B/P (MAP) 133/68 (89) 140/75 (96) 150/78 (102) 


 


Pulse Ox 99 94 95 


 


O2 Delivery Room Air Room Air Room Air Room Air


 


    





    





 11/14/18 11/14/18 11/15/18 11/15/18





 20:04 23:41 03:21 07:15


 


Temp  98.3 97.6 97.8





  98.3 97.6 97.8


 


Pulse 78 65 70 69


 


Resp  16 16 17


 


B/P (MAP) 150/78 119/65 (83) 130/83 (99) 142/79 (100)


 


Pulse Ox  97 95 97


 


O2 Delivery  Room Air Room Air Room Air


 


    





    





 11/15/18 11/15/18  





 08:00 09:40  


 


Pulse  69  


 


B/P (MAP)  142/79  


 


O2 Delivery Room Air   














Intake and Output   


 


 11/14/18 11/14/18 11/15/18





 15:00 23:00 07:00


 


Intake Total 150 ml 490 ml 200 ml


 


Output Total  850 ml 500 ml


 


Balance 150 ml -360 ml -300 ml

















LUANA WIGGINS III DO Nov 15, 2018 10:35
PROGRESS NOTES


Chief Complaint


Chief Complaint


Urinary Retention and SOA





History of Present Illness


History of Present Illness


Pt was examined today and reports feeling well. Last night pt had a fever of 

101.5 at 2327 (11/13/18). Pt has shaw catheter still in place and IV fluids 

hanging. Discussed possible discharge tomorrow if he remains afebrile and 

feeling well. Chest CT (11/13) showed Bibasilar scar or atelectasis with 

diffuse bronchial wall thickening; Mild emphysema; Mild ectasia of the 

ascending thoracic aorta; Indeterminate small low-density nodules in the liver, 

stable from prior exam.








Pt was seen at bedside. He has no acute overnight events.  Pt has shaw 

catheter in place, IV fluids hanging, and Cipro antibiotics IV on board


[facility] Urology NP, she stated that they intend to keep the shaw catheter in place 

for 1 week, at which time the patient is scheduled to follow up at the urology 

clinic, they plan to do a TURP as some point in the near future.


DW patient leaving the shaw in for 1 week, and the probable procedure he will 

undergo with urology to resolve the prostamegaly.  Also discussed possible 

discharge tomorrow AM if WBC has lowered below threshold. Current WBC at 16.2 

down from 20.


Pt was agreeable to remain in hospital until tomorrow.





Vitals


Vitals





Vital Signs








  Date Time  Temp Pulse Resp B/P (MAP) Pulse Ox O2 Delivery O2 Flow Rate FiO2


 


11/14/18 08:36  71  116/67    


 


11/14/18 07:13      Room Air  


 


11/14/18 07:00 98.6  16  93   





 98.6       











Physical Exam


General:  Alert, Oriented X3, Cooperative, No acute distress


Heart:  Regular rate, Normal S1, Normal S2


Lungs:  Clear, Other (no wheezes appreciated on ascultation)


Abdomen:  Normal bowel sounds, Soft


Extremities:  No clubbing


Skin:  No rashes





Labs


LABS





Laboratory Tests








Test


 11/14/18


05:25


 


Sodium Level


 137 mmol/L


(136-145)


 


Potassium Level


 4.2 mmol/L


(3.5-5.1)


 


Chloride Level


 106 mmol/L


()


 


Carbon Dioxide Level


 22 mmol/L


(21-32)


 


Anion Gap 9 (6-14) 


 


Blood Urea Nitrogen


 24 mg/dL


(8-26)


 


Creatinine


 1.7 mg/dL


(0.7-1.3)


 


Estimated GFR


(Cockcroft-Gault) 39.9 





 


Glucose Level


 102 mg/dL


(70-99)


 


Calcium Level


 8.1 mg/dL


(8.5-10.1)


 


Magnesium Level


 1.8 mg/dL


(1.8-2.4)











Review of Systems


Review of Systems


CV: Denies chest pain, palpitations


RESP: No blood in sputum, no wheezing


GI: No vomit, no swallowing difficulty.





Assessment and Plan


Assessmemt and Plan


ASSESSMENT:


Urinary Retention


UTI/Possible Pyelonephritis


FAWAD Cr of 1.7 


CKD Stage 3


Prostatomegaly


Chronic SOB





PLAN:


Recheck labs in am, recheck temperature.


Shaw catheter to remain when discharged, pt education on maintenance and use


Home meds


Urology consult


Consult PT/OT


Possible discharge tomorrow if feeling well and remains afebrile








Problems


Medical Problems:


(1) Acute on chronic renal failure


Status: Acute  





(2) Chronic shortness of breath


Status: Acute  





(3) Leukocytosis


Status: Acute  








Comment


Review of Relevant


I have reviewed the following items joel (where applicable) has been applied.


Labs





Laboratory Tests








Test


 11/12/18


11:00 11/12/18


11:45 11/12/18


15:07 11/13/18


05:35


 


Urine Collection Type Unknown    


 


Urine Color Yellow    


 


Urine Clarity Turbid    


 


Urine pH 6.0    


 


Urine Specific Gravity 1.020    


 


Urine Protein


 30 mg/dL


(NEG-TRACE) 


 


 





 


Urine Glucose (UA)


 Negative mg/dL


(NEG) 


 


 





 


Urine Ketones (Stick)


 Negative mg/dL


(NEG) 


 


 





 


Urine Blood Moderate (NEG)    


 


Urine Nitrite Positive (NEG)    


 


Urine Bilirubin Negative (NEG)    


 


Urine Urobilinogen Dipstick


 1.0 mg/dL (0.2


mg/dL) 


 


 





 


Urine Leukocyte Esterase Large (NEG)    


 


Urine RBC Occ /HPF (0-2)    


 


Urine WBC >40 /HPF (0-4)    


 


Urine Squamous Epithelial


Cells Occ /LPF 


 


 


 





 


Urine Bacteria


 Many /HPF


(0-FEW) 


 


 





 


White Blood Count


 


 20.7 x10^3/uL


(4.0-11.0) 


 16.2 x10^3/uL


(4.0-11.0)


 


Red Blood Count


 


 4.50 x10^6/uL


(4.30-5.70) 


 3.71 x10^6/uL


(4.30-5.70)


 


Hemoglobin


 


 13.6 g/dL


(13.0-17.5) 


 11.3 g/dL


(13.0-17.5)


 


Hematocrit


 


 40.2 %


(39.0-53.0) 


 33.5 %


(39.0-53.0)


 


Mean Corpuscular Volume  89 fL ()   90 fL () 


 


Mean Corpuscular Hemoglobin  30 pg (25-35)   31 pg (25-35) 


 


Mean Corpuscular Hemoglobin


Concent 


 34 g/dL


(31-37) 


 34 g/dL


(31-37)


 


Red Cell Distribution Width


 


 14.9 %


(11.5-14.5) 


 15.2 %


(11.5-14.5)


 


Platelet Count


 


 232 x10^3/uL


(140-400) 


 166 x10^3/uL


(140-400)


 


Neutrophils (%) (Auto)  85 % (31-73)   81 % (31-73) 


 


Lymphocytes (%) (Auto)  4 % (24-48)   9 % (24-48) 


 


Monocytes (%) (Auto)  11 % (0-9)   10 % (0-9) 


 


Eosinophils (%) (Auto)  0 % (0-3)   0 % (0-3) 


 


Basophils (%) (Auto)  0 % (0-3)   1 % (0-3) 


 


Neutrophils # (Auto)


 


 17.6 x10^3uL


(1.8-7.7) 


 13.1 x10^3uL


(1.8-7.7)


 


Lymphocytes # (Auto)


 


 0.9 x10^3/uL


(1.0-4.8) 


 1.4 x10^3/uL


(1.0-4.8)


 


Monocytes # (Auto)


 


 2.2 x10^3/uL


(0.0-1.1) 


 1.5 x10^3/uL


(0.0-1.1)


 


Eosinophils # (Auto)


 


 0.0 x10^3/uL


(0.0-0.7) 


 0.0 x10^3/uL


(0.0-0.7)


 


Basophils # (Auto)


 


 0.0 x10^3/uL


(0.0-0.2) 


 0.1 x10^3/uL


(0.0-0.2)


 


Segmented Neutrophils %  75 % (35-66)   


 


Band Neutrophils %  7 % (0-9)   


 


Lymphocytes %  6 % (24-48)   


 


Monocytes %  12 % (0-10)   


 


Toxic Granulation  Slight   


 


Platelet Estimate


 


 Adequate


(ADEQUATE) 


 





 


Sodium Level


 


 138 mmol/L


(136-145) 


 137 mmol/L


(136-145)


 


Potassium Level


 


 4.1 mmol/L


(3.5-5.1) 


 4.2 mmol/L


(3.5-5.1)


 


Chloride Level


 


 101 mmol/L


() 


 104 mmol/L


()


 


Carbon Dioxide Level


 


 25 mmol/L


(21-32) 


 24 mmol/L


(21-32)


 


Anion Gap  12 (6-14)   9 (6-14) 


 


Blood Urea Nitrogen


 


 39 mg/dL


(8-26) 


 31 mg/dL


(8-26)


 


Creatinine


 


 2.2 mg/dL


(0.7-1.3) 


 1.8 mg/dL


(0.7-1.3)


 


Estimated GFR


(Cockcroft-Gault) 


 29.7 


 


 37.4 





 


BUN/Creatinine Ratio  18 (6-20)   


 


Glucose Level


 


 110 mg/dL


(70-99) 


 107 mg/dL


(70-99)


 


Calcium Level


 


 9.0 mg/dL


(8.5-10.1) 


 8.3 mg/dL


(8.5-10.1)


 


Magnesium Level


 


 1.9 mg/dL


(1.8-2.4) 


 





 


Total Bilirubin


 


 1.8 mg/dL


(0.2-1.0) 


 





 


Aspartate Amino Transf


(AST/SGOT) 


 16 U/L (15-37) 


 


 





 


Alanine Aminotransferase


(ALT/SGPT) 


 20 U/L (16-63) 


 


 





 


Alkaline Phosphatase


 


 70 U/L


() 


 





 


Troponin I Quantitative


 


 < 0.017 ng/mL


(0.000-0.055) 


 





 


NT-Pro-B-Type Natriuretic


Peptide 


 5531 pg/mL


(0-124) 


 





 


Total Protein


 


 7.3 g/dL


(6.4-8.2) 


 





 


Albumin


 


 3.2 g/dL


(3.4-5.0) 


 





 


Albumin/Globulin Ratio  0.8 (1.0-1.7)   


 


Thyroid Stimulating Hormone


(TSH) 


 1.746 uIU/mL


(0.358-3.74) 


 





 


Lactic Acid Level


 


 


 2.3 mmol/L


(0.4-2.0) 





 


Test


 11/14/18


05:25 


 


 





 


Sodium Level


 137 mmol/L


(136-145) 


 


 





 


Potassium Level


 4.2 mmol/L


(3.5-5.1) 


 


 





 


Chloride Level


 106 mmol/L


() 


 


 





 


Carbon Dioxide Level


 22 mmol/L


(21-32) 


 


 





 


Anion Gap 9 (6-14)    


 


Blood Urea Nitrogen


 24 mg/dL


(8-26) 


 


 





 


Creatinine


 1.7 mg/dL


(0.7-1.3) 


 


 





 


Estimated GFR


(Cockcroft-Gault) 39.9 


 


 


 





 


Glucose Level


 102 mg/dL


(70-99) 


 


 





 


Calcium Level


 8.1 mg/dL


(8.5-10.1) 


 


 





 


Magnesium Level


 1.8 mg/dL


(1.8-2.4) 


 


 











Laboratory Tests








Test


 11/14/18


05:25


 


Sodium Level


 137 mmol/L


(136-145)


 


Potassium Level


 4.2 mmol/L


(3.5-5.1)


 


Chloride Level


 106 mmol/L


()


 


Carbon Dioxide Level


 22 mmol/L


(21-32)


 


Anion Gap 9 (6-14) 


 


Blood Urea Nitrogen


 24 mg/dL


(8-26)


 


Creatinine


 1.7 mg/dL


(0.7-1.3)


 


Estimated GFR


(Cockcroft-Gault) 39.9 





 


Glucose Level


 102 mg/dL


(70-99)


 


Calcium Level


 8.1 mg/dL


(8.5-10.1)


 


Magnesium Level


 1.8 mg/dL


(1.8-2.4)








Microbiology


11/12/18 Urine Culture - Preliminary, Resulted


           


11/12/18 Urine Culture Result 1 (SATYA) - Preliminary, Resulted


Medications





Current Medications


Tamsulosin HCl (Flomax) 0.4 mg 1X  ONCE PO  Last administered on 11/12/18at 11:

50;  Start 11/12/18 at 11:00;  Stop 11/12/18 at 11:01;  Status DC


Albuterol/ Ipratropium (Duoneb) 3 ml 1X  ONCE NEB  Last administered on 11/12/ 18at 11:33;  Start 11/12/18 at 11:00;  Stop 11/12/18 at 11:01;  Status DC


Acetaminophen/ Hydrocodone Bitart (Lortab 5/325) 1 tab 1X  ONCE PO  Last 

administered on 11/12/18at 11:51;  Start 11/12/18 at 11:30;  Stop 11/12/18 at 11

:33;  Status DC


Ceftriaxone Sodium 50 ml @  100 mls/hr 1X  ONCE IV  Last administered on 11/12/ 18at 12:15;  Start 11/12/18 at 12:15;  Stop 11/12/18 at 12:44;  Status DC


Morphine Sulfate (Morphine Sulfate) 5 mg 1X  ONCE IV  Last administered on 11/12 /18at 13:05;  Start 11/12/18 at 13:15;  Stop 11/12/18 at 13:16;  Status DC


Sodium Chloride 1,000 ml @  1,000 mls/hr 1X  ONCE IV  Last administered on 11/12 /18at 14:31;  Start 11/12/18 at 14:30;  Stop 11/12/18 at 15:29;  Status DC


Ondansetron HCl (Zofran) 4 mg PRN Q8HRS  PRN IV NAUSEA/VOMITING Last 

administered on 11/12/18at 21:11;  Start 11/12/18 at 14:30;  Stop 11/13/18 at 14

:29;  Status DC


Morphine Sulfate (Morphine Sulfate) 4 mg PRN Q2HR  PRN IV PAIN;  Start 11/12/18 

at 14:30;  Stop 11/13/18 at 14:29;  Status DC


Acetaminophen (Tylenol) 650 mg PRN Q4HRS  PRN PO FEVER;  Start 11/12/18 at 14:30

;  Stop 11/13/18 at 14:29;  Status DC


Sodium Chloride 1,000 ml @  100 mls/hr Q10H IV  Last administered on 11/14/18at 

08:38;  Start 11/12/18 at 14:30


Ciprofloxacin/ Dextrose 200 ml @  200 mls/hr Q12HR IV  Last administered on 11/ 14/18at 08:39;  Start 11/12/18 at 21:00


Tamsulosin HCl (Flomax) 0.4 mg DAILY PO  Last administered on 11/14/18at 08:37;

  Start 11/13/18 at 09:00


Ondansetron HCl (Zofran) 4 mg 1X  ONCE IV  Last administered on 11/12/18at 14:31

;  Start 11/12/18 at 14:30;  Stop 11/12/18 at 14:31;  Status DC


Aspirin (Ecotrin) 325 mg DAILY PO  Last administered on 11/14/18at 08:36;  

Start 11/13/18 at 09:00


Atorvastatin Calcium (Lipitor) 20 mg QHS PO  Last administered on 11/13/18at 20:

23;  Start 11/12/18 at 21:00


Metoprolol Succinate (Toprol Xl) 25 mg HS PO ;  Start 11/12/18 at 21:00


Pantoprazole Sodium (Protonix) 40 mg DAILYAC PO  Last administered on 11/14/ 18at 08:36;  Start 11/13/18 at 07:30


Lisinopril (Prinivil) 2.5 mg DAILY PO  Last administered on 11/14/18at 08:36;  

Start 11/13/18 at 09:00


Oxycodone/ Acetaminophen (Percocet 5/325) 2 tab PRN Q4HRS  PRN PO MODERATE PAIN

, 2ND CHOICE Last administered on 11/12/18at 20:30;  Start 11/12/18 at 17:45


Tamsulosin HCl (Flomax) 0.4 mg QHS PO  Last administered on 11/13/18at 20:23;  

Start 11/12/18 at 21:00


Sodium Chloride 1,000 ml @  75 mls/hr [id number] IV ;  Start 11/12/18 at 17:45;  

Stop 11/12/18 at 19:12;  Status DC


Promethazine HCl/ Codeine (Phenergan With Codeine Oral Syrup) 5 ml PRN Q4HRS  

PRN PO COUGH Last administered on 11/13/18at 20:21;  Start 11/12/18 at 20:45


Promethazine HCl/ Codeine (Phenergan With Codeine Oral Syrup) 10 ml PRN Q4HRS  

PRN PO COUGH;  Start 11/12/18 at 20:45


Sodium Chloride 500 ml @  500 mls/hr 1X  ONCE IV  Last administered on 11/13/ 18at 05:19;  Start 11/13/18 at 05:30;  Stop 11/13/18 at 06:29;  Status DC


Influenza Virus Vaccine (Afluria Trivalent 7417-6301 Syringe) 0.5 ml ONCE ONCE 

VAX IM  Last administered on 11/13/18at 09:45;  Start 11/13/18 at 11:00;  Stop 

11/13/18 at 11:01;  Status DC


Lactobacillus Rhamnosus (Culturelle) 1 cap BID PO  Last administered on 11/14/ 18at 08:37;  Start 11/13/18 at 21:00


Sodium Chloride 500 ml @  500 mls/hr 1X  ONCE IV  Last administered on 11/13/ 18at 12:03;  Start 11/13/18 at 12:00;  Stop 11/13/18 at 12:59;  Status DC


Zolpidem Tartrate (Ambien) 5 mg PRN QHS  PRN PO INSOMNIA Last administered on 11 /13/18at 22:24;  Start 11/13/18 at 21:15


Acetaminophen (Tylenol) 650 mg PRN Q4HRS  PRN PO FEVER Last administered on 11/ 14/18at 00:32;  Start 11/13/18 at 23:45





Active Scripts


Active


[Oxycodone Hcl/Acetaminophen] 1 TAB Tablet 2 Tab PO PRN Q4HRS PRN


Plavix (Clopidogrel Bisulfate) 75 Mg Tablet 75 Mg PO [id number]


Lipitor (Atorvastatin Calcium) 20 Mg Tablet 20 Mg PO QHS


Reported


Lisinopril 2.5 Mg Tablet 2.5 Mg PO DAILY


Metoprolol Succinate ( Xl ) (Metoprolol Succinate) 25 Mg Tab.er.24h 25 Mg PO HS


Protonix (Pantoprazole Sodium) 40 Mg Tablet. 40 Mg PO DAILY


Oxycodone-Acetaminophen 5-325 (Oxycodone Hcl/Acetaminophen) 1 Each Tablet 2 

Each PO Q6HRS PRN


Aspirin Ec (Aspirin) 325 Mg Tablet.dr 1 Tab PO DAILY


Vitals/I & O





Vital Sign - Last 24 Hours








 11/13/18 11/13/18 11/13/18 11/13/18





 11:00 15:00 19:15 20:00


 


Temp 97.9 98.6 98.2 





 97.9 98.6 98.2 


 


Pulse 74 81 81 


 


Resp 20 20 16 


 


B/P (MAP) 90/47 (61) 121/54 (76) 121/58 (79) 


 


Pulse Ox 94 95 97 


 


O2 Delivery Room Air Room Air Room Air Room Air


 


    





    





 11/13/18 11/13/18 11/14/18 11/14/18





 21:00 23:27 03:05 07:00


 


Temp  101.5 98.7 98.6





  101.5 98.7 98.6


 


Pulse 81 85 70 71


 


Resp  20 20 16


 


B/P (MAP) 121/58 103/44 (63) 94/45 (61) 116/67 (83)


 


Pulse Ox  95 94 93


 


O2 Delivery  Room Air Room Air Room Air


 


    





    





 11/14/18 11/14/18  





 07:13 08:36  


 


Pulse  71  


 


B/P (MAP)  116/67  


 


O2 Delivery Room Air   














Intake and Output   


 


 11/13/18 11/13/18 11/14/18





 15:00 23:00 07:00


 


Intake Total 300 ml 100 ml 300 ml


 


Output Total 300 ml 575 ml 350 ml


 


Balance 0 ml -475 ml -50 ml

















LUANA WIGGINS III DO Nov 14, 2018 11:03
PROGRESS NOTES


Chief Complaint


Chief Complaint


Urinary Retention and SOA





History of Present Illness


History of Present Illness


Pt was seen at bedside. He has no acute overnight events.  Pt has shaw 

catheter in place, IV fluids hanging, and Cipro antibiotics IV on board


[facility] NP, she stated that they intend to keep the shaw catheter in place 

for 1 week, at which time the patient is scheduled to follow up at the urology 

clinic, they plan to do a TURP as some point in the near future.


DW patient leaving the shaw in for 1 week, and the probable procedure he will 

undergo with urology to resolve the prostamegaly.  Also discussed possible 

discharge tomorrow AM if WBC has lowered below threshold. Current WBC at 16.2 

down from 20.


Pt was agreeable to remain in hospital until tomorrow.





Vitals


Vitals





Vital Signs








  Date Time  Temp Pulse Resp B/P (MAP) Pulse Ox O2 Delivery O2 Flow Rate FiO2


 


11/13/18 08:09  65  95/56    


 


11/13/18 07:22      Room Air  


 


11/13/18 07:00 97.7  20  95   





 97.7       











Physical Exam


General:  Alert, Oriented X3, Cooperative, No acute distress


Heart:  Regular rate, Normal S1, Normal S2


Lungs:  Clear, Other (no wheezes appreciated on ascultation)


Abdomen:  Normal bowel sounds, Soft


Extremities:  No clubbing, No cyanosis


Skin:  No rashes, No breakdown





Labs


LABS





Laboratory Tests








Test


 11/12/18


11:00 11/12/18


11:45 11/12/18


15:07 11/13/18


05:35


 


Urine Collection Type Unknown    


 


Urine Color Yellow    


 


Urine Clarity Turbid    


 


Urine pH 6.0    


 


Urine Specific Gravity 1.020    


 


Urine Protein


 30 mg/dL


(NEG-TRACE) 


 


 





 


Urine Glucose (UA)


 Negative mg/dL


(NEG) 


 


 





 


Urine Ketones (Stick)


 Negative mg/dL


(NEG) 


 


 





 


Urine Blood Moderate (NEG)    


 


Urine Nitrite Positive (NEG)    


 


Urine Bilirubin Negative (NEG)    


 


Urine Urobilinogen Dipstick


 1.0 mg/dL (0.2


mg/dL) 


 


 





 


Urine Leukocyte Esterase Large (NEG)    


 


Urine RBC Occ /HPF (0-2)    


 


Urine WBC >40 /HPF (0-4)    


 


Urine Squamous Epithelial


Cells Occ /LPF 


 


 


 





 


Urine Bacteria


 Many /HPF


(0-FEW) 


 


 





 


White Blood Count


 


 20.7 x10^3/uL


(4.0-11.0) 


 16.2 x10^3/uL


(4.0-11.0)


 


Red Blood Count


 


 4.50 x10^6/uL


(4.30-5.70) 


 3.71 x10^6/uL


(4.30-5.70)


 


Hemoglobin


 


 13.6 g/dL


(13.0-17.5) 


 11.3 g/dL


(13.0-17.5)


 


Hematocrit


 


 40.2 %


(39.0-53.0) 


 33.5 %


(39.0-53.0)


 


Mean Corpuscular Volume  89 fL ()   90 fL () 


 


Mean Corpuscular Hemoglobin  30 pg (25-35)   31 pg (25-35) 


 


Mean Corpuscular Hemoglobin


Concent 


 34 g/dL


(31-37) 


 34 g/dL


(31-37)


 


Red Cell Distribution Width


 


 14.9 %


(11.5-14.5) 


 15.2 %


(11.5-14.5)


 


Platelet Count


 


 232 x10^3/uL


(140-400) 


 166 x10^3/uL


(140-400)


 


Neutrophils (%) (Auto)  85 % (31-73)   81 % (31-73) 


 


Lymphocytes (%) (Auto)  4 % (24-48)   9 % (24-48) 


 


Monocytes (%) (Auto)  11 % (0-9)   10 % (0-9) 


 


Eosinophils (%) (Auto)  0 % (0-3)   0 % (0-3) 


 


Basophils (%) (Auto)  0 % (0-3)   1 % (0-3) 


 


Neutrophils # (Auto)


 


 17.6 x10^3uL


(1.8-7.7) 


 13.1 x10^3uL


(1.8-7.7)


 


Lymphocytes # (Auto)


 


 0.9 x10^3/uL


(1.0-4.8) 


 1.4 x10^3/uL


(1.0-4.8)


 


Monocytes # (Auto)


 


 2.2 x10^3/uL


(0.0-1.1) 


 1.5 x10^3/uL


(0.0-1.1)


 


Eosinophils # (Auto)


 


 0.0 x10^3/uL


(0.0-0.7) 


 0.0 x10^3/uL


(0.0-0.7)


 


Basophils # (Auto)


 


 0.0 x10^3/uL


(0.0-0.2) 


 0.1 x10^3/uL


(0.0-0.2)


 


Segmented Neutrophils %  75 % (35-66)   


 


Band Neutrophils %  7 % (0-9)   


 


Lymphocytes %  6 % (24-48)   


 


Monocytes %  12 % (0-10)   


 


Toxic Granulation  Slight   


 


Platelet Estimate


 


 Adequate


(ADEQUATE) 


 





 


Sodium Level


 


 138 mmol/L


(136-145) 


 137 mmol/L


(136-145)


 


Potassium Level


 


 4.1 mmol/L


(3.5-5.1) 


 4.2 mmol/L


(3.5-5.1)


 


Chloride Level


 


 101 mmol/L


() 


 104 mmol/L


()


 


Carbon Dioxide Level


 


 25 mmol/L


(21-32) 


 24 mmol/L


(21-32)


 


Anion Gap  12 (6-14)   9 (6-14) 


 


Blood Urea Nitrogen


 


 39 mg/dL


(8-26) 


 31 mg/dL


(8-26)


 


Creatinine


 


 2.2 mg/dL


(0.7-1.3) 


 1.8 mg/dL


(0.7-1.3)


 


Estimated GFR


(Cockcroft-Gault) 


 29.7 


 


 37.4 





 


BUN/Creatinine Ratio  18 (6-20)   


 


Glucose Level


 


 110 mg/dL


(70-99) 


 107 mg/dL


(70-99)


 


Calcium Level


 


 9.0 mg/dL


(8.5-10.1) 


 8.3 mg/dL


(8.5-10.1)


 


Magnesium Level


 


 1.9 mg/dL


(1.8-2.4) 


 





 


Total Bilirubin


 


 1.8 mg/dL


(0.2-1.0) 


 





 


Aspartate Amino Transf


(AST/SGOT) 


 16 U/L (15-37) 


 


 





 


Alanine Aminotransferase


(ALT/SGPT) 


 20 U/L (16-63) 


 


 





 


Alkaline Phosphatase


 


 70 U/L


() 


 





 


Troponin I Quantitative


 


 < 0.017 ng/mL


(0.000-0.055) 


 





 


NT-Pro-B-Type Natriuretic


Peptide 


 5531 pg/mL


(0-124) 


 





 


Total Protein


 


 7.3 g/dL


(6.4-8.2) 


 





 


Albumin


 


 3.2 g/dL


(3.4-5.0) 


 





 


Albumin/Globulin Ratio  0.8 (1.0-1.7)   


 


Thyroid Stimulating Hormone


(TSH) 


 1.746 uIU/mL


(0.358-3.74) 


 





 


Lactic Acid Level


 


 


 2.3 mmol/L


(0.4-2.0) 














Review of Systems


Review of Systems


General: denies fever, chills, night sweats, unexplained wt loss


Cardio: Denies chest pain, palpitations


Pulm: Denies cough





Assessment and Plan


Assessmemt and Plan


Assessment


Acute Renal Failure


Pyelonephritis


Leukocytosis


BPH


Chronic shortness of Breath





Plan


Probable discharge tomorrow if WBC count is below threshold


CBC- trend WBC for probable discharge tomorrow


IV fluids


IV cipro


Flomax


Shaw catheter 


Await results of Urine Culture


Appreciate Urology input concerning TURP procedure


Appreciate Nephrology input


Cardiac diet


Continue home meds


Continue PT/OT














Comment


Review of Relevant


I have reviewed the following items joel (where applicable) has been applied.


Labs





Laboratory Tests








Test


 11/12/18


11:00 11/12/18


11:45 11/12/18


15:07 11/13/18


05:35


 


Urine Collection Type Unknown    


 


Urine Color Yellow    


 


Urine Clarity Turbid    


 


Urine pH 6.0    


 


Urine Specific Gravity 1.020    


 


Urine Protein


 30 mg/dL


(NEG-TRACE) 


 


 





 


Urine Glucose (UA)


 Negative mg/dL


(NEG) 


 


 





 


Urine Ketones (Stick)


 Negative mg/dL


(NEG) 


 


 





 


Urine Blood Moderate (NEG)    


 


Urine Nitrite Positive (NEG)    


 


Urine Bilirubin Negative (NEG)    


 


Urine Urobilinogen Dipstick


 1.0 mg/dL (0.2


mg/dL) 


 


 





 


Urine Leukocyte Esterase Large (NEG)    


 


Urine RBC Occ /HPF (0-2)    


 


Urine WBC >40 /HPF (0-4)    


 


Urine Squamous Epithelial


Cells Occ /LPF 


 


 


 





 


Urine Bacteria


 Many /HPF


(0-FEW) 


 


 





 


White Blood Count


 


 20.7 x10^3/uL


(4.0-11.0) 


 16.2 x10^3/uL


(4.0-11.0)


 


Red Blood Count


 


 4.50 x10^6/uL


(4.30-5.70) 


 3.71 x10^6/uL


(4.30-5.70)


 


Hemoglobin


 


 13.6 g/dL


(13.0-17.5) 


 11.3 g/dL


(13.0-17.5)


 


Hematocrit


 


 40.2 %


(39.0-53.0) 


 33.5 %


(39.0-53.0)


 


Mean Corpuscular Volume  89 fL ()   90 fL () 


 


Mean Corpuscular Hemoglobin  30 pg (25-35)   31 pg (25-35) 


 


Mean Corpuscular Hemoglobin


Concent 


 34 g/dL


(31-37) 


 34 g/dL


(31-37)


 


Red Cell Distribution Width


 


 14.9 %


(11.5-14.5) 


 15.2 %


(11.5-14.5)


 


Platelet Count


 


 232 x10^3/uL


(140-400) 


 166 x10^3/uL


(140-400)


 


Neutrophils (%) (Auto)  85 % (31-73)   81 % (31-73) 


 


Lymphocytes (%) (Auto)  4 % (24-48)   9 % (24-48) 


 


Monocytes (%) (Auto)  11 % (0-9)   10 % (0-9) 


 


Eosinophils (%) (Auto)  0 % (0-3)   0 % (0-3) 


 


Basophils (%) (Auto)  0 % (0-3)   1 % (0-3) 


 


Neutrophils # (Auto)


 


 17.6 x10^3uL


(1.8-7.7) 


 13.1 x10^3uL


(1.8-7.7)


 


Lymphocytes # (Auto)


 


 0.9 x10^3/uL


(1.0-4.8) 


 1.4 x10^3/uL


(1.0-4.8)


 


Monocytes # (Auto)


 


 2.2 x10^3/uL


(0.0-1.1) 


 1.5 x10^3/uL


(0.0-1.1)


 


Eosinophils # (Auto)


 


 0.0 x10^3/uL


(0.0-0.7) 


 0.0 x10^3/uL


(0.0-0.7)


 


Basophils # (Auto)


 


 0.0 x10^3/uL


(0.0-0.2) 


 0.1 x10^3/uL


(0.0-0.2)


 


Segmented Neutrophils %  75 % (35-66)   


 


Band Neutrophils %  7 % (0-9)   


 


Lymphocytes %  6 % (24-48)   


 


Monocytes %  12 % (0-10)   


 


Toxic Granulation  Slight   


 


Platelet Estimate


 


 Adequate


(ADEQUATE) 


 





 


Sodium Level


 


 138 mmol/L


(136-145) 


 137 mmol/L


(136-145)


 


Potassium Level


 


 4.1 mmol/L


(3.5-5.1) 


 4.2 mmol/L


(3.5-5.1)


 


Chloride Level


 


 101 mmol/L


() 


 104 mmol/L


()


 


Carbon Dioxide Level


 


 25 mmol/L


(21-32) 


 24 mmol/L


(21-32)


 


Anion Gap  12 (6-14)   9 (6-14) 


 


Blood Urea Nitrogen


 


 39 mg/dL


(8-26) 


 31 mg/dL


(8-26)


 


Creatinine


 


 2.2 mg/dL


(0.7-1.3) 


 1.8 mg/dL


(0.7-1.3)


 


Estimated GFR


(Cockcroft-Gault) 


 29.7 


 


 37.4 





 


BUN/Creatinine Ratio  18 (6-20)   


 


Glucose Level


 


 110 mg/dL


(70-99) 


 107 mg/dL


(70-99)


 


Calcium Level


 


 9.0 mg/dL


(8.5-10.1) 


 8.3 mg/dL


(8.5-10.1)


 


Magnesium Level


 


 1.9 mg/dL


(1.8-2.4) 


 





 


Total Bilirubin


 


 1.8 mg/dL


(0.2-1.0) 


 





 


Aspartate Amino Transf


(AST/SGOT) 


 16 U/L (15-37) 


 


 





 


Alanine Aminotransferase


(ALT/SGPT) 


 20 U/L (16-63) 


 


 





 


Alkaline Phosphatase


 


 70 U/L


() 


 





 


Troponin I Quantitative


 


 < 0.017 ng/mL


(0.000-0.055) 


 





 


NT-Pro-B-Type Natriuretic


Peptide 


 5531 pg/mL


(0-124) 


 





 


Total Protein


 


 7.3 g/dL


(6.4-8.2) 


 





 


Albumin


 


 3.2 g/dL


(3.4-5.0) 


 





 


Albumin/Globulin Ratio  0.8 (1.0-1.7)   


 


Thyroid Stimulating Hormone


(TSH) 


 1.746 uIU/mL


(0.358-3.74) 


 





 


Lactic Acid Level


 


 


 2.3 mmol/L


(0.4-2.0) 











Laboratory Tests








Test


 11/12/18


11:00 11/12/18


11:45 11/12/18


15:07 11/13/18


05:35


 


Urine Collection Type Unknown    


 


Urine Color Yellow    


 


Urine Clarity Turbid    


 


Urine pH 6.0    


 


Urine Specific Gravity 1.020    


 


Urine Protein


 30 mg/dL


(NEG-TRACE) 


 


 





 


Urine Glucose (UA)


 Negative mg/dL


(NEG) 


 


 





 


Urine Ketones (Stick)


 Negative mg/dL


(NEG) 


 


 





 


Urine Blood Moderate (NEG)    


 


Urine Nitrite Positive (NEG)    


 


Urine Bilirubin Negative (NEG)    


 


Urine Urobilinogen Dipstick


 1.0 mg/dL (0.2


mg/dL) 


 


 





 


Urine Leukocyte Esterase Large (NEG)    


 


Urine RBC Occ /HPF (0-2)    


 


Urine WBC >40 /HPF (0-4)    


 


Urine Squamous Epithelial


Cells Occ /LPF 


 


 


 





 


Urine Bacteria


 Many /HPF


(0-FEW) 


 


 





 


White Blood Count


 


 20.7 x10^3/uL


(4.0-11.0) 


 16.2 x10^3/uL


(4.0-11.0)


 


Red Blood Count


 


 4.50 x10^6/uL


(4.30-5.70) 


 3.71 x10^6/uL


(4.30-5.70)


 


Hemoglobin


 


 13.6 g/dL


(13.0-17.5) 


 11.3 g/dL


(13.0-17.5)


 


Hematocrit


 


 40.2 %


(39.0-53.0) 


 33.5 %


(39.0-53.0)


 


Mean Corpuscular Volume  89 fL ()   90 fL () 


 


Mean Corpuscular Hemoglobin  30 pg (25-35)   31 pg (25-35) 


 


Mean Corpuscular Hemoglobin


Concent 


 34 g/dL


(31-37) 


 34 g/dL


(31-37)


 


Red Cell Distribution Width


 


 14.9 %


(11.5-14.5) 


 15.2 %


(11.5-14.5)


 


Platelet Count


 


 232 x10^3/uL


(140-400) 


 166 x10^3/uL


(140-400)


 


Neutrophils (%) (Auto)  85 % (31-73)   81 % (31-73) 


 


Lymphocytes (%) (Auto)  4 % (24-48)   9 % (24-48) 


 


Monocytes (%) (Auto)  11 % (0-9)   10 % (0-9) 


 


Eosinophils (%) (Auto)  0 % (0-3)   0 % (0-3) 


 


Basophils (%) (Auto)  0 % (0-3)   1 % (0-3) 


 


Neutrophils # (Auto)


 


 17.6 x10^3uL


(1.8-7.7) 


 13.1 x10^3uL


(1.8-7.7)


 


Lymphocytes # (Auto)


 


 0.9 x10^3/uL


(1.0-4.8) 


 1.4 x10^3/uL


(1.0-4.8)


 


Monocytes # (Auto)


 


 2.2 x10^3/uL


(0.0-1.1) 


 1.5 x10^3/uL


(0.0-1.1)


 


Eosinophils # (Auto)


 


 0.0 x10^3/uL


(0.0-0.7) 


 0.0 x10^3/uL


(0.0-0.7)


 


Basophils # (Auto)


 


 0.0 x10^3/uL


(0.0-0.2) 


 0.1 x10^3/uL


(0.0-0.2)


 


Segmented Neutrophils %  75 % (35-66)   


 


Band Neutrophils %  7 % (0-9)   


 


Lymphocytes %  6 % (24-48)   


 


Monocytes %  12 % (0-10)   


 


Toxic Granulation  Slight   


 


Platelet Estimate


 


 Adequate


(ADEQUATE) 


 





 


Sodium Level


 


 138 mmol/L


(136-145) 


 137 mmol/L


(136-145)


 


Potassium Level


 


 4.1 mmol/L


(3.5-5.1) 


 4.2 mmol/L


(3.5-5.1)


 


Chloride Level


 


 101 mmol/L


() 


 104 mmol/L


()


 


Carbon Dioxide Level


 


 25 mmol/L


(21-32) 


 24 mmol/L


(21-32)


 


Anion Gap  12 (6-14)   9 (6-14) 


 


Blood Urea Nitrogen


 


 39 mg/dL


(8-26) 


 31 mg/dL


(8-26)


 


Creatinine


 


 2.2 mg/dL


(0.7-1.3) 


 1.8 mg/dL


(0.7-1.3)


 


Estimated GFR


(Cockcroft-Gault) 


 29.7 


 


 37.4 





 


BUN/Creatinine Ratio  18 (6-20)   


 


Glucose Level


 


 110 mg/dL


(70-99) 


 107 mg/dL


(70-99)


 


Calcium Level


 


 9.0 mg/dL


(8.5-10.1) 


 8.3 mg/dL


(8.5-10.1)


 


Magnesium Level


 


 1.9 mg/dL


(1.8-2.4) 


 





 


Total Bilirubin


 


 1.8 mg/dL


(0.2-1.0) 


 





 


Aspartate Amino Transf


(AST/SGOT) 


 16 U/L (15-37) 


 


 





 


Alanine Aminotransferase


(ALT/SGPT) 


 20 U/L (16-63) 


 


 





 


Alkaline Phosphatase


 


 70 U/L


() 


 





 


Troponin I Quantitative


 


 < 0.017 ng/mL


(0.000-0.055) 


 





 


NT-Pro-B-Type Natriuretic


Peptide 


 5531 pg/mL


(0-124) 


 





 


Total Protein


 


 7.3 g/dL


(6.4-8.2) 


 





 


Albumin


 


 3.2 g/dL


(3.4-5.0) 


 





 


Albumin/Globulin Ratio  0.8 (1.0-1.7)   


 


Thyroid Stimulating Hormone


(TSH) 


 1.746 uIU/mL


(0.358-3.74) 


 





 


Lactic Acid Level


 


 


 2.3 mmol/L


(0.4-2.0) 











Medications





Current Medications


Tamsulosin HCl (Flomax) 0.4 mg 1X  ONCE PO  Last administered on 11/12/18at 11:

50;  Start 11/12/18 at 11:00;  Stop 11/12/18 at 11:01;  Status DC


Albuterol/ Ipratropium (Duoneb) 3 ml 1X  ONCE NEB  Last administered on 11/12/ 18at 11:33;  Start 11/12/18 at 11:00;  Stop 11/12/18 at 11:01;  Status DC


Acetaminophen/ Hydrocodone Bitart (Lortab 5/325) 1 tab 1X  ONCE PO  Last 

administered on 11/12/18at 11:51;  Start 11/12/18 at 11:30;  Stop 11/12/18 at 11

:33;  Status DC


Ceftriaxone Sodium 50 ml @  100 mls/hr 1X  ONCE IV  Last administered on 11/12/ 18at 12:15;  Start 11/12/18 at 12:15;  Stop 11/12/18 at 12:44;  Status DC


Morphine Sulfate (Morphine Sulfate) 5 mg 1X  ONCE IV  Last administered on 11/12 /18at 13:05;  Start 11/12/18 at 13:15;  Stop 11/12/18 at 13:16;  Status DC


Sodium Chloride 1,000 ml @  1,000 mls/hr 1X  ONCE IV  Last administered on 11/12 /18at 14:31;  Start 11/12/18 at 14:30;  Stop 11/12/18 at 15:29;  Status DC


Ondansetron HCl (Zofran) 4 mg PRN Q8HRS  PRN IV NAUSEA/VOMITING Last 

administered on 11/12/18at 21:11;  Start 11/12/18 at 14:30;  Stop 11/13/18 at 14

:29


Morphine Sulfate (Morphine Sulfate) 4 mg PRN Q2HR  PRN IV PAIN;  Start 11/12/18 

at 14:30;  Stop 11/13/18 at 14:29


Acetaminophen (Tylenol) 650 mg PRN Q4HRS  PRN PO FEVER;  Start 11/12/18 at 14:30

;  Stop 11/13/18 at 14:29


Sodium Chloride 1,000 ml @  75 mls/hr [id number] IV  Last administered on 11/13/ 18at 05:15;  Start 11/12/18 at 14:30


Ciprofloxacin/ Dextrose 200 ml @  200 mls/hr Q12HR IV  Last administered on 11/ 13/18at 08:08;  Start 11/12/18 at 21:00


Tamsulosin HCl (Flomax) 0.4 mg DAILY PO  Last administered on 11/13/18at 08:09;

  Start 11/13/18 at 09:00


Ondansetron HCl (Zofran) 4 mg 1X  ONCE IV  Last administered on 11/12/18at 14:31

;  Start 11/12/18 at 14:30;  Stop 11/12/18 at 14:31;  Status DC


Aspirin (Ecotrin) 325 mg DAILY PO  Last administered on 11/13/18at 08:08;  

Start 11/13/18 at 09:00


Atorvastatin Calcium (Lipitor) 20 mg QHS PO  Last administered on 11/12/18at 20:

31;  Start 11/12/18 at 21:00


Metoprolol Succinate (Toprol Xl) 25 mg HS PO ;  Start 11/12/18 at 21:00


Pantoprazole Sodium (Protonix) 40 mg DAILYAC PO  Last administered on 11/13/ 18at 08:08;  Start 11/13/18 at 07:30


Lisinopril (Prinivil) 2.5 mg DAILY PO ;  Start 11/13/18 at 09:00


Oxycodone/ Acetaminophen (Percocet 5/325) 2 tab PRN Q4HRS  PRN PO MODERATE PAIN

, 2ND CHOICE Last administered on 11/12/18at 20:30;  Start 11/12/18 at 17:45


Tamsulosin HCl (Flomax) 0.4 mg QHS PO  Last administered on 11/12/18at 20:31;  

Start 11/12/18 at 21:00


Sodium Chloride 1,000 ml @  75 mls/hr [id number] IV ;  Start 11/12/18 at 17:45;  

Stop 11/12/18 at 19:12;  Status DC


Promethazine HCl/ Codeine (Phenergan With Codeine Oral Syrup) 5 ml PRN Q4HRS  

PRN PO COUGH Last administered on 11/13/18at 08:08;  Start 11/12/18 at 20:45


Promethazine HCl/ Codeine (Phenergan With Codeine Oral Syrup) 10 ml PRN Q4HRS  

PRN PO COUGH;  Start 11/12/18 at 20:45


Sodium Chloride 500 ml @  500 mls/hr 1X  ONCE IV  Last administered on 11/13/ 18at 05:19;  Start 11/13/18 at 05:30;  Stop 11/13/18 at 06:29;  Status DC


Influenza Virus Vaccine (Afluria Trivalent 8117-2380 Syringe) 0.5 ml ONCE ONCE 

VAX IM ;  Start 11/13/18 at 11:00;  Stop 11/13/18 at 11:01





Active Scripts


Active


[Oxycodone Hcl/Acetaminophen] 1 TAB Tablet 2 Tab PO PRN Q4HRS PRN


Plavix (Clopidogrel Bisulfate) 75 Mg Tablet 75 Mg PO [id number]


Lipitor (Atorvastatin Calcium) 20 Mg Tablet 20 Mg PO QHS


Reported


Lisinopril 2.5 Mg Tablet 2.5 Mg PO DAILY


Metoprolol Succinate ( Xl ) (Metoprolol Succinate) 25 Mg Tab.er.24h 25 Mg PO HS


Protonix (Pantoprazole Sodium) 40 Mg Tablet.dr 40 Mg PO DAILY


Oxycodone-Acetaminophen 5-325 (Oxycodone Hcl/Acetaminophen) 1 Each Tablet 2 

Each PO Q6HRS PRN


Aspirin Ec (Aspirin) 325 Mg Tablet.dr 1 Tab PO DAILY


Vitals/I & O





Vital Sign - Last 24 Hours








 11/12/18 11/12/18 11/12/18 11/12/18





 10:15 11:00 11:33 12:00


 


Temp 99.2   





 99.2   


 


Pulse 93 82  79


 


Resp 16 16  18


 


B/P (MAP) 118/55 (76) 131/78 (95)  136/84 (101)


 


Pulse Ox 97 99 96 99


 


O2 Delivery Room Air Room Air Room Air Room Air


 


    





    





 11/12/18 11/12/18 11/12/18 11/12/18





 13:00 13:05 14:00 15:00


 


Pulse 72  72 78


 


Resp 18 16 18 16


 


B/P (MAP) 142/81 (101)  137/83 (101) 122/88 (99)


 


Pulse Ox 99 99 99 99


 


O2 Delivery Room Air  Room Air Room Air





 11/12/18 11/12/18 11/12/18 11/12/18





 15:45 15:45 19:15 20:00


 


Temp  97.7 99.7 





  97.7 99.7 


 


Pulse  91 99 


 


Resp  18 20 


 


B/P (MAP)  107/55 (72) 92/40 (57) 


 


Pulse Ox  97 98 


 


O2 Delivery Room Air Room Air Room Air Room Air


 


    





    





 11/12/18 11/12/18 11/12/18 11/12/18





 20:14 20:30 21:30 23:15


 


Temp    99.3





    99.3


 


Pulse 99   83


 


Resp    20


 


B/P (MAP) 92/40   105/50 (68)


 


Pulse Ox    96


 


O2 Delivery  Room Air Room Air Room Air


 


    





    





 11/13/18 11/13/18 11/13/18 11/13/18





 03:20 04:40 06:24 07:00


 


Temp 97.6   97.7





 97.6   97.7


 


Pulse 75  68 65


 


Resp 18   20


 


B/P (MAP) 91/48 (62) 87/51 (63) 102/51 (68) 95/56 (69)


 


Pulse Ox 95   95


 


O2 Delivery Room Air   Room Air


 


    





    





 11/13/18 11/13/18  





 07:22 08:09  


 


Pulse  65  


 


B/P (MAP)  95/56  


 


O2 Delivery Room Air   














Intake and Output   


 


 11/12/18 11/12/18 11/13/18





 15:00 23:00 07:00


 


Intake Total  240 ml 600 ml


 


Output Total   950 ml


 


Balance  240 ml -350 ml

















LUANA WIGGINS III DO Nov 13, 2018 09:50
PROGRESS NOTES


Chief Complaint


Chief Complaint


Urinary Retention and SOA





History of Present Illness


History of Present Illness


Pt was seen today at bedside and his breathing was visibly improved from 

yesterday. Pt stated he was glad he stayed another night and said his breathing 

is "much better." Discussed with him discharge today and taking his home meds 

and abx. He was prescribed Simvacort inhaler& Ciprofloxacin to use at home. Pt 

was counseled on use of his catheter and was given a leg bag and shown how to 

use it. He was seen by urology and pulmonology today and both are okay with 

discharge. Per urology, he will need to keep catheter in place when discharged. 





Pt was examined at bedside sitting up, he has increased work of breathing and 

complaining of soa. Pt was afebrile overnight, shaw catheter still in place 

and IV fluids hanging, cipro antibiotics IV on board. 


CECILY pt and son in law concerning soa, called pulmonology on phone and they 

advised stat CXR to check for possible pneumonia before discharging.  


 Chest CT (11/13) showed Bibasilar scar or atelectasis with diffuse bronchial 

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 

Indeterminate small low-density nodules in the liver, stable from prior exam.


[facility]rology NP on 11/13, she stated that they intend to keep the shaw catheter 

in place for 1 week, at which time the patient is scheduled to follow up at the 

urology clinic, they plan to do a TURP as some point in the near future.


DW patient leaving the shaw in for 1 week, and the probable procedure he will 

undergo with urology to resolve the prostatomegaly.





Vitals


Vitals





Vital Signs








  Date Time  Temp Pulse Resp B/P (MAP) Pulse Ox O2 Delivery O2 Flow Rate FiO2


 


11/16/18 08:53  74  155/75    


 


11/16/18 08:00      Room Air  


 


11/16/18 07:00 98.3  18  96   





 98.3       











Physical Exam


General:  Alert, Oriented X3, mild distress


Heart:  Regular rate, Normal S1, Normal S2


Lungs:  Clear


Abdomen:  Normal bowel sounds, Soft


Extremities:  No clubbing


Skin:  No rashes





Labs


LABS





Laboratory Tests








Test


 11/16/18


04:15


 


Sodium Level


 141 mmol/L


(136-145)


 


Potassium Level


 4.1 mmol/L


(3.5-5.1)


 


Chloride Level


 104 mmol/L


()


 


Carbon Dioxide Level


 28 mmol/L


(21-32)


 


Anion Gap 9 (6-14) 


 


Blood Urea Nitrogen


 17 mg/dL


(8-26)


 


Creatinine


 1.6 mg/dL


(0.7-1.3)


 


Estimated GFR


(Cockcroft-Gault) 42.8 





 


Glucose Level


 96 mg/dL


(70-99)


 


Calcium Level


 8.5 mg/dL


(8.5-10.1)











Review of Systems


Review of Systems


HEENT: No headache, no visual changes, no hearing changes


GI: No abdominal pain, n/v


SKIN: No breakdown, rashes.





Assessment and Plan


Assessmemt and Plan


ASSESSMENT:


Acute on chronic renal failure


Chronic shortness of breath


Leukocytosis


Urinary retention


UTI


BPH





PLAN:


Prescribed Simvacort inhaler and Ciprofloxacin for take home


Discharge today


Cleared for discharge by urology & pulmonology


Home Meds


Consult PT/OT


VTE Prophylaxis


Shaw Catheter w/ leg bag to remain in upon discharge - nursing to educate pt 

on shaw catheter care


Per Urology, continue flomax as an outpatient


Per Pulmonology, one dose of Lasix would help him improve


Pt has appointment set to see Dr. Hidalgo next week for voiding trial on 11/21/18 

at 0940.











Comment


Review of Relevant


I have reviewed the following items joel (where applicable) has been applied.


Labs





Laboratory Tests








Test


 11/15/18


02:50 11/16/18


04:15


 


White Blood Count


 5.1 x10^3/uL


(4.0-11.0) 





 


Red Blood Count


 3.50 x10^6/uL


(4.30-5.70) 





 


Hemoglobin


 10.8 g/dL


(13.0-17.5) 





 


Hematocrit


 31.5 %


(39.0-53.0) 





 


Mean Corpuscular Volume 90 fL ()  


 


Mean Corpuscular Hemoglobin 31 pg (25-35)  


 


Mean Corpuscular Hemoglobin


Concent 34 g/dL


(31-37) 





 


Red Cell Distribution Width


 15.4 %


(11.5-14.5) 





 


Platelet Count


 173 x10^3/uL


(140-400) 





 


Neutrophils (%) (Auto) 63 % (31-73)  


 


Lymphocytes (%) (Auto) 19 % (24-48)  


 


Monocytes (%) (Auto) 14 % (0-9)  


 


Eosinophils (%) (Auto) 3 % (0-3)  


 


Basophils (%) (Auto) 1 % (0-3)  


 


Neutrophils # (Auto)


 3.2 x10^3uL


(1.8-7.7) 





 


Lymphocytes # (Auto)


 1.0 x10^3/uL


(1.0-4.8) 





 


Monocytes # (Auto)


 0.7 x10^3/uL


(0.0-1.1) 





 


Eosinophils # (Auto)


 0.1 x10^3/uL


(0.0-0.7) 





 


Basophils # (Auto)


 0.0 x10^3/uL


(0.0-0.2) 





 


Sodium Level


 139 mmol/L


(136-145) 141 mmol/L


(136-145)


 


Potassium Level


 4.2 mmol/L


(3.5-5.1) 4.1 mmol/L


(3.5-5.1)


 


Chloride Level


 108 mmol/L


() 104 mmol/L


()


 


Carbon Dioxide Level


 21 mmol/L


(21-32) 28 mmol/L


(21-32)


 


Anion Gap 10 (6-14)  9 (6-14) 


 


Blood Urea Nitrogen


 18 mg/dL


(8-26) 17 mg/dL


(8-26)


 


Creatinine


 1.4 mg/dL


(0.7-1.3) 1.6 mg/dL


(0.7-1.3)


 


Estimated GFR


(Cockcroft-Gault) 50.0 


 42.8 





 


Glucose Level


 104 mg/dL


(70-99) 96 mg/dL


(70-99)


 


Calcium Level


 8.2 mg/dL


(8.5-10.1) 8.5 mg/dL


(8.5-10.1)


 


Magnesium Level


 1.8 mg/dL


(1.8-2.4) 











Laboratory Tests








Test


 11/16/18


04:15


 


Sodium Level


 141 mmol/L


(136-145)


 


Potassium Level


 4.1 mmol/L


(3.5-5.1)


 


Chloride Level


 104 mmol/L


()


 


Carbon Dioxide Level


 28 mmol/L


(21-32)


 


Anion Gap 9 (6-14) 


 


Blood Urea Nitrogen


 17 mg/dL


(8-26)


 


Creatinine


 1.6 mg/dL


(0.7-1.3)


 


Estimated GFR


(Cockcroft-Gault) 42.8 





 


Glucose Level


 96 mg/dL


(70-99)


 


Calcium Level


 8.5 mg/dL


(8.5-10.1)








Microbiology


11/12/18 Urine Culture - Final, Complete


           


11/12/18 Urine Culture Result 1 (SATYA) - Final, Complete


           


11/12/18 Antimicrobic Susceptibility - Final, Complete


Medications





Current Medications


Tamsulosin HCl (Flomax) 0.4 mg 1X  ONCE PO  Last administered on 11/12/18at 11:

50;  Start 11/12/18 at 11:00;  Stop 11/12/18 at 11:01;  Status DC


Albuterol/ Ipratropium (Duoneb) 3 ml 1X  ONCE NEB  Last administered on 11/12/ 18at 11:33;  Start 11/12/18 at 11:00;  Stop 11/12/18 at 11:01;  Status DC


Acetaminophen/ Hydrocodone Bitart (Lortab 5/325) 1 tab 1X  ONCE PO  Last 

administered on 11/12/18at 11:51;  Start 11/12/18 at 11:30;  Stop 11/12/18 at 11

:33;  Status DC


Ceftriaxone Sodium 50 ml @  100 mls/hr 1X  ONCE IV  Last administered on 11/12/ 18at 12:15;  Start 11/12/18 at 12:15;  Stop 11/12/18 at 12:44;  Status DC


Morphine Sulfate (Morphine Sulfate) 5 mg 1X  ONCE IV  Last administered on 11/12 /18at 13:05;  Start 11/12/18 at 13:15;  Stop 11/12/18 at 13:16;  Status DC


Sodium Chloride 1,000 ml @  1,000 mls/hr 1X  ONCE IV  Last administered on 11/12 /18at 14:31;  Start 11/12/18 at 14:30;  Stop 11/12/18 at 15:29;  Status DC


Ondansetron HCl (Zofran) 4 mg PRN Q8HRS  PRN IV NAUSEA/VOMITING Last 

administered on 11/12/18at 21:11;  Start 11/12/18 at 14:30;  Stop 11/13/18 at 14

:29;  Status DC


Morphine Sulfate (Morphine Sulfate) 4 mg PRN Q2HR  PRN IV PAIN;  Start 11/12/18 

at 14:30;  Stop 11/13/18 at 14:29;  Status DC


Acetaminophen (Tylenol) 650 mg PRN Q4HRS  PRN PO FEVER;  Start 11/12/18 at 14:30

;  Stop 11/13/18 at 14:29;  Status DC


Sodium Chloride 1,000 ml @  100 mls/hr Q10H IV  Last administered on 11/14/18at 

20:16;  Start 11/12/18 at 14:30;  Stop 11/15/18 at 11:03;  Status DC


Ciprofloxacin/ Dextrose 200 ml @  200 mls/hr Q12HR IV  Last administered on 11/ 16/18at 08:53;  Start 11/12/18 at 21:00


Tamsulosin HCl (Flomax) 0.4 mg DAILY PO  Last administered on 11/15/18at 09:43;

  Start 11/13/18 at 09:00;  Stop 11/15/18 at 16:18;  Status DC


Ondansetron HCl (Zofran) 4 mg 1X  ONCE IV  Last administered on 11/12/18at 14:31

;  Start 11/12/18 at 14:30;  Stop 11/12/18 at 14:31;  Status DC


Aspirin (Ecotrin) 325 mg DAILY PO  Last administered on 11/16/18at 08:52;  

Start 11/13/18 at 09:00


Atorvastatin Calcium (Lipitor) 20 mg QHS PO  Last administered on 11/15/18at 20:

32;  Start 11/12/18 at 21:00


Metoprolol Succinate (Toprol Xl) 25 mg HS PO  Last administered on 11/15/18at 20

:33;  Start 11/12/18 at 21:00


Pantoprazole Sodium (Protonix) 40 mg DAILYAC PO  Last administered on 11/16/ 18at 08:53;  Start 11/13/18 at 07:30


Lisinopril (Prinivil) 2.5 mg DAILY PO  Last administered on 11/16/18at 08:53;  

Start 11/13/18 at 09:00


Oxycodone/ Acetaminophen (Percocet 5/325) 2 tab PRN Q4HRS  PRN PO MODERATE PAIN

, 2ND CHOICE Last administered on 11/15/18at 22:12;  Start 11/12/18 at 17:45


Tamsulosin HCl (Flomax) 0.4 mg QHS PO  Last administered on 11/15/18at 20:32;  

Start 11/12/18 at 21:00


Sodium Chloride 1,000 ml @  75 mls/hr [id number] IV ;  Start 11/12/18 at 17:45;  

Stop 11/12/18 at 19:12;  Status DC


Promethazine HCl/ Codeine (Phenergan With Codeine Oral Syrup) 5 ml PRN Q4HRS  

PRN PO COUGH Last administered on 11/15/18at 20:32;  Start 11/12/18 at 20:45


Promethazine HCl/ Codeine (Phenergan With Codeine Oral Syrup) 10 ml PRN Q4HRS  

PRN PO COUGH;  Start 11/12/18 at 20:45


Sodium Chloride 500 ml @  500 mls/hr 1X  ONCE IV  Last administered on 11/13/ 18at 05:19;  Start 11/13/18 at 05:30;  Stop 11/13/18 at 06:29;  Status DC


Influenza Virus Vaccine (Afluria Trivalent [id number] Syringe) 0.5 ml ONCE ONCE 

VAX IM  Last administered on 11/13/18at 09:45;  Start 11/13/18 at 11:00;  Stop 

11/13/18 at 11:01;  Status DC


Lactobacillus Rhamnosus (Culturelle) 1 cap BID PO  Last administered on 11/16/ 18at 08:52;  Start 11/13/18 at 21:00


Sodium Chloride 500 ml @  500 mls/hr 1X  ONCE IV  Last administered on 11/13/ 18at 12:03;  Start 11/13/18 at 12:00;  Stop 11/13/18 at 12:59;  Status DC


Zolpidem Tartrate (Ambien) 5 mg PRN QHS  PRN PO INSOMNIA Last administered on 11

/15/18at 22:02;  Start 11/13/18 at 21:15


Acetaminophen (Tylenol) 650 mg PRN Q4HRS  PRN PO FEVER Last administered on 11/ 14/18at 20:03;  Start 11/13/18 at 23:45


Furosemide (Lasix) 40 mg 1X  ONCE IVP  Last administered on 11/15/18at 09:46;  

Start 11/15/18 at 09:45;  Stop 11/15/18 at 09:46;  Status DC





Active Scripts


Active


[Oxycodone Hcl/Acetaminophen] 1 TAB Tablet 2 Tab PO PRN Q4HRS PRN


Plavix (Clopidogrel Bisulfate) 75 Mg Tablet 75 Mg PO DAILYWBKFT


Lipitor (Atorvastatin Calcium) 20 Mg Tablet 20 Mg PO QHS


Reported


Lisinopril 2.5 Mg Tablet 2.5 Mg PO DAILY


Metoprolol Succinate ( Xl ) (Metoprolol Succinate) 25 Mg Tab.er.24h 25 Mg PO HS


Protonix (Pantoprazole Sodium) 40 Mg Tablet. 40 Mg PO DAILY


Oxycodone-Acetaminophen 5-325 (Oxycodone Hcl/Acetaminophen) 1 Each Tablet 2 

Each PO Q6HRS PRN


Aspirin Ec (Aspirin) 325 Mg Tablet.dr 1 Tab PO DAILY


Vitals/I & O





Vital Sign - Last 24 Hours








 11/15/18 11/15/18 11/15/18 11/15/18





 14:58 19:43 20:00 20:33


 


Temp 98.1 98.2  





 98.1 98.2  


 


Pulse 67 74  74


 


Resp 18 18  


 


B/P (MAP) 125/63 (83) 137/73 (94)  137/73


 


Pulse Ox 96 97  


 


O2 Delivery Room Air Room Air Room Air 


 


    





    





 11/15/18 11/15/18 11/15/18 11/16/18





 22:12 23:12 23:41 03:44


 


Temp   98.3 98.3





   98.3 98.3


 


Pulse   68 71


 


Resp   18 18


 


B/P (MAP)   138/66 (90) 145/79 (101)


 


Pulse Ox   97 97


 


O2 Delivery Room Air Room Air Room Air Room Air


 


    





    





 11/16/18 11/16/18 11/16/18 





 07:00 08:00 08:53 


 


Temp 98.3   





 98.3   


 


Pulse 74  74 


 


Resp 18   


 


B/P (MAP) 155/75 (101)  155/75 


 


Pulse Ox 96   


 


O2 Delivery Room Air Room Air  














Intake and Output   


 


 11/15/18 11/15/18 11/16/18





 15:00 23:00 07:00


 


Intake Total 600 ml  350 ml


 


Output Total 2750 ml 850 ml 800 ml


 


Balance -2150 ml -850 ml -450 ml

















LUANA WIGGINS III DO Nov 16, 2018 11:39
PULMONARY PROGRESS NOTES


Subjective


no soa


Vitals





Vital Signs








  Date Time  Temp Pulse Resp B/P (MAP) Pulse Ox O2 Delivery O2 Flow Rate FiO2


 


11/16/18 08:53  74  155/75    


 


11/16/18 08:00      Room Air  


 


11/16/18 07:00 98.3  18  96   





 98.3       








General:  Alert, No acute distress


Lungs:  Clear


Cardiovascular:  S1, S2


Abdomen:  Soft


Neuro Exam:  Alert


Extremities:  No Edema


Skin:  Warm


Labs





Laboratory Tests








Test


 11/15/18


02:50 11/16/18


04:15


 


White Blood Count


 5.1 x10^3/uL


(4.0-11.0) 





 


Red Blood Count


 3.50 x10^6/uL


(4.30-5.70) 





 


Hemoglobin


 10.8 g/dL


(13.0-17.5) 





 


Hematocrit


 31.5 %


(39.0-53.0) 





 


Mean Corpuscular Volume 90 fL ()  


 


Mean Corpuscular Hemoglobin 31 pg (25-35)  


 


Mean Corpuscular Hemoglobin


Concent 34 g/dL


(31-37) 





 


Red Cell Distribution Width


 15.4 %


(11.5-14.5) 





 


Platelet Count


 173 x10^3/uL


(140-400) 





 


Neutrophils (%) (Auto) 63 % (31-73)  


 


Lymphocytes (%) (Auto) 19 % (24-48)  


 


Monocytes (%) (Auto) 14 % (0-9)  


 


Eosinophils (%) (Auto) 3 % (0-3)  


 


Basophils (%) (Auto) 1 % (0-3)  


 


Neutrophils # (Auto)


 3.2 x10^3uL


(1.8-7.7) 





 


Lymphocytes # (Auto)


 1.0 x10^3/uL


(1.0-4.8) 





 


Monocytes # (Auto)


 0.7 x10^3/uL


(0.0-1.1) 





 


Eosinophils # (Auto)


 0.1 x10^3/uL


(0.0-0.7) 





 


Basophils # (Auto)


 0.0 x10^3/uL


(0.0-0.2) 





 


Sodium Level


 139 mmol/L


(136-145) 141 mmol/L


(136-145)


 


Potassium Level


 4.2 mmol/L


(3.5-5.1) 4.1 mmol/L


(3.5-5.1)


 


Chloride Level


 108 mmol/L


() 104 mmol/L


()


 


Carbon Dioxide Level


 21 mmol/L


(21-32) 28 mmol/L


(21-32)


 


Anion Gap 10 (6-14)  9 (6-14) 


 


Blood Urea Nitrogen


 18 mg/dL


(8-26) 17 mg/dL


(8-26)


 


Creatinine


 1.4 mg/dL


(0.7-1.3) 1.6 mg/dL


(0.7-1.3)


 


Estimated GFR


(Cockcroft-Gault) 50.0 


 42.8 





 


Glucose Level


 104 mg/dL


(70-99) 96 mg/dL


(70-99)


 


Calcium Level


 8.2 mg/dL


(8.5-10.1) 8.5 mg/dL


(8.5-10.1)


 


Magnesium Level


 1.8 mg/dL


(1.8-2.4) 











Laboratory Tests








Test


 11/16/18


04:15


 


Sodium Level


 141 mmol/L


(136-145)


 


Potassium Level


 4.1 mmol/L


(3.5-5.1)


 


Chloride Level


 104 mmol/L


()


 


Carbon Dioxide Level


 28 mmol/L


(21-32)


 


Anion Gap 9 (6-14) 


 


Blood Urea Nitrogen


 17 mg/dL


(8-26)


 


Creatinine


 1.6 mg/dL


(0.7-1.3)


 


Estimated GFR


(Cockcroft-Gault) 42.8 





 


Glucose Level


 96 mg/dL


(70-99)


 


Calcium Level


 8.5 mg/dL


(8.5-10.1)








Medications





Active Scripts








 Medications  Dose


 Route/Sig


 Max Daily Dose Days Date Category


 


 Lisinopril 2.5 Mg


 Tablet  2.5 Mg


 PO DAILY


   1/7/17 Reported


 


 Metoprolol


 Succinate ( Xl )


  (Metoprolol


 Succinate) 25 Mg


 Tab.er.24h  25 Mg


 PO HS


   1/7/17 Reported


 


 Protonix


  (Pantoprazole


 Sodium) 40 Mg


 Tablet.  40 Mg


 PO DAILY


   1/7/17 Reported


 


 Oxycodone-Acetaminophen


 5-325 (Oxycodone


 Hcl/Acetaminophen)


 1 Each Tablet  2 Each


 PO Q6HRS PRN


   9/9/15 Reported


 


 Aspirin Ec


  (Aspirin) 325 Mg


 Tablet.dr  1 Tab


 PO DAILY


   9/9/15 Reported


 


 [Oxycodone


 Hcl/Acetaminophen]


 1 TAB Tablet  2 Tab


 PO PRN Q4HRS PRN


   9/9/15 Rx


 


 Plavix


  (Clopidogrel


 Bisulfate) 75 Mg


 Tablet  75 Mg


 PO DAILYWBKFT


   9/9/15 Rx


 


 Lipitor


  (Atorvastatin


 Calcium) 20 Mg


 Tablet  20 Mg


 PO QHS


   9/9/15 Rx











Impression


.


1.  Acute dyspnea secondary to development of mild interstitial edema in a


patient who has known cardiomyopathy with an ejection fraction of 35%.


2.  The patient with previous cardiac catheterization in 01/2017.  Only 2 out of


5 grafts were patent and had unsuccessful recanalization of subacute chronic


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


3.  Highly suspected chronic obstructive pulmonary disease, smoked for 48 years.


4.  Recent Escherichia coli urinary tract infection.





Plan


.


RECOMMENDATIONS:


1.  From a Pulmonary standpoint, he is better with one dose of


Lasix.


2.  Follow him clinically.


3.  Continue antibiotics per Dr. Agarwal's recommendation for Escherichia coli


urinary tract infection.


4.  improved white cell count 


5.  P.r.n. bronchodilators.


6.  Discussed with RN / PFTs as an outpatient.


     ok with DEO Pearce MD Nov 16, 2018 10:33
Renal-Progress Notes


Subjective Notes


Notes


NO NEW COMPLAINTS





History of Present Illness


Hx of present illness


STABLE





Vitals


Vitals





Vital Signs








  Date Time  Temp Pulse Resp B/P (MAP) Pulse Ox O2 Delivery O2 Flow Rate FiO2


 


11/15/18 10:45 97.5 71 18 150/89 (109) 97 Room Air  





 97.5       








Weight


Weight [ ]





I.O.


Intake and Output











Intake and Output 


 


 11/15/18





 07:00


 


Intake Total 840 ml


 


Output Total 1350 ml


 


Balance -510 ml


 


 


 


Intake Oral 840 ml


 


Output Urine Total 1350 ml


 


# Bowel Movements 1











Labs


Labs





Laboratory Tests








Test


 11/15/18


02:50


 


White Blood Count


 5.1 x10^3/uL


(4.0-11.0)


 


Red Blood Count


 3.50 x10^6/uL


(4.30-5.70)


 


Hemoglobin


 10.8 g/dL


(13.0-17.5)


 


Hematocrit


 31.5 %


(39.0-53.0)


 


Mean Corpuscular Volume 90 fL () 


 


Mean Corpuscular Hemoglobin 31 pg (25-35) 


 


Mean Corpuscular Hemoglobin


Concent 34 g/dL


(31-37)


 


Red Cell Distribution Width


 15.4 %


(11.5-14.5)


 


Platelet Count


 173 x10^3/uL


(140-400)


 


Neutrophils (%) (Auto) 63 % (31-73) 


 


Lymphocytes (%) (Auto) 19 % (24-48) 


 


Monocytes (%) (Auto) 14 % (0-9) 


 


Eosinophils (%) (Auto) 3 % (0-3) 


 


Basophils (%) (Auto) 1 % (0-3) 


 


Neutrophils # (Auto)


 3.2 x10^3uL


(1.8-7.7)


 


Lymphocytes # (Auto)


 1.0 x10^3/uL


(1.0-4.8)


 


Monocytes # (Auto)


 0.7 x10^3/uL


(0.0-1.1)


 


Eosinophils # (Auto)


 0.1 x10^3/uL


(0.0-0.7)


 


Basophils # (Auto)


 0.0 x10^3/uL


(0.0-0.2)


 


Sodium Level


 139 mmol/L


(136-145)


 


Potassium Level


 4.2 mmol/L


(3.5-5.1)


 


Chloride Level


 108 mmol/L


()


 


Carbon Dioxide Level


 21 mmol/L


(21-32)


 


Anion Gap 10 (6-14) 


 


Blood Urea Nitrogen


 18 mg/dL


(8-26)


 


Creatinine


 1.4 mg/dL


(0.7-1.3)


 


Estimated GFR


(Cockcroft-Gault) 50.0 





 


Glucose Level


 104 mg/dL


(70-99)


 


Calcium Level


 8.2 mg/dL


(8.5-10.1)


 


Magnesium Level


 1.8 mg/dL


(1.8-2.4)











Micro


Micro





Microbiology


11/12/18 Urine Culture - Final, Complete


           


11/12/18 Urine Culture Result 1 (SATYA) - Final, Complete


           


11/12/18 Antimicrobic Susceptibility - Final, Complete





Review of Systems


Constitutional:  yes: alert, oriented


Ears/Nose/Throat:  Yes: no symptom reported


Eyes:  Yes: no symptom reported


Cardiovascular:  Yes no symptom reported


Gastrointestional:  Yes: no symptom reported


Genitourinary:  Yes: no symptom reported


Musculoskeletal:  Yes: no symptom reported


Skin:  Yes no symptom reported


Psychiatric/Neurological:  Yes: no symptom reported


Endocrine:  Yes: no symptom reported





Physical Exam


General Appearance:  no apparent distress


Skin:  warm


Respiratory:  decreased breath sounds


Heart:  S1S2


Abdomen:  soft, bowel sounds present


Genitourinary:  bladder flat


Extremities:  pulses present, no edema





Assessment


Assessment


IMP





FAWAD-BETTER WITH CR DOWN TO 1.4-NEARLY RESOLVED


URINARY RETENTION


UTI


BPH





PLAN





D/C IVF'S


ENC PO


LOONEY


ANTIBIOTICS


UROLOGY FOLLOWING











JAVY SHANNON MD Nov 15, 2018 11:02
Renal-Progress Notes


Subjective Notes


Notes


NONE





History of Present Illness


Hx of present illness


STABLE





Vitals


Vitals





Vital Signs








  Date Time  Temp Pulse Resp B/P (MAP) Pulse Ox O2 Delivery O2 Flow Rate FiO2


 


11/14/18 08:36  71  116/67    


 


11/14/18 07:13      Room Air  


 


11/14/18 07:00 98.6  16  93   





 98.6       








Weight


Weight [ ]





I.O.


Intake and Output











Intake and Output 


 


 11/14/18





 07:00


 


Intake Total 700 ml


 


Output Total 1225 ml


 


Balance -525 ml


 


 


 


Intake Oral 700 ml


 


Output Urine Total 1225 ml











Labs


Labs





Laboratory Tests








Test


 11/14/18


05:25


 


Sodium Level


 137 mmol/L


(136-145)


 


Potassium Level


 4.2 mmol/L


(3.5-5.1)


 


Chloride Level


 106 mmol/L


()


 


Carbon Dioxide Level


 22 mmol/L


(21-32)


 


Anion Gap 9 (6-14) 


 


Blood Urea Nitrogen


 24 mg/dL


(8-26)


 


Creatinine


 1.7 mg/dL


(0.7-1.3)


 


Estimated GFR


(Cockcroft-Gault) 39.9 





 


Glucose Level


 102 mg/dL


(70-99)


 


Calcium Level


 8.1 mg/dL


(8.5-10.1)


 


Magnesium Level


 1.8 mg/dL


(1.8-2.4)











Micro


Micro





Microbiology


11/12/18 Urine Culture - Preliminary, Resulted


           


11/12/18 Urine Culture Result 1 (SATYA) - Preliminary, Resulted





Review of Systems


Constitutional:  yes: alert, oriented


Ears/Nose/Throat:  Yes: no symptom reported


Eyes:  Yes: no symptom reported


Cardiovascular:  Yes no symptom reported


Gastrointestional:  Yes: no symptom reported


Genitourinary:  Yes: no symptom reported


Musculoskeletal:  Yes: no symptom reported


Skin:  Yes no symptom reported


Psychiatric/Neurological:  Yes: no symptom reported


Endocrine:  Yes: no symptom reported





Physical Exam


General Appearance:  no apparent distress


Skin:  warm


Respiratory:  decreased breath sounds


Heart:  S1S2


Abdomen:  soft, bowel sounds present


Genitourinary:  bladder flat


Extremities:  pulses present, no edema





Assessment


Assessment


IMP





FAWAD-BETTER WITH CR DOWN TO 1.7


URINARY RETENTION


UTI


BPH





PLAN





IVF'S


LOONEY


ANTIBIOTICS











JAVY SHANNON MD Nov 14, 2018 09:55
SUBJECTIVE


Subjective


Pt had a comfortable night but did spike a fever. Feeling Ok today and catheter 

is not bothering him.





OBJECTIVE


Objective


Physical Exam: 


General appearance: Alert and Oriented


Head: Normocephalic, without obvious abnormality


Eyes: conjunctivae/corneas clear. PERRL, EOM's intact. Fundi benign


Lungs: regular respirations, non labored breathing. 


Pelvic: shaw catheter in place draining clear yellow urine.  Device in good 

working order.


Vital Signs





Vital Signs








  Date Time  Temp Pulse Resp B/P (MAP) Pulse Ox O2 Delivery O2 Flow Rate FiO2


 


11/14/18 08:36  71  116/67    


 


11/14/18 07:13      Room Air  


 


11/14/18 07:00 98.6 71 16 116/67 (83) 93 Room Air  





 98.6       


 


11/14/18 03:05 98.7 70 20 94/45 (61) 94 Room Air  





 98.7       


 


11/13/18 23:27 101.5 85 20 103/44 (63) 95 Room Air  





 101.5       


 


11/13/18 21:00  81  121/58    


 


11/13/18 20:00      Room Air  


 


11/13/18 19:15 98.2 81 16 121/58 (79) 97 Room Air  





 98.2       


 


11/13/18 15:00 98.6 81 20 121/54 (76) 95 Room Air  





 98.6       


 


11/13/18 11:00 97.9 74 20 90/47 (61) 94 Room Air  





 97.9       








I & O











Intake and Output 


 


 11/14/18





 07:00


 


Intake Total 700 ml


 


Output Total 1225 ml


 


Balance -525 ml


 


 


 


Intake Oral 700 ml


 


Output Urine Total 1225 ml











PHYSICAL EXAM


Physical Exam


Physical Exam: 


General appearance: Alert and Oriented


Head: Normocephalic, without obvious abnormality


Eyes: conjunctivae/corneas clear. PERRL, EOM's intact. Fundi benign


Lungs: regular respirations, non labored breathing. 


Pelvic: shaw catheter in place draining clear yellow urine.  Device in good 

working order.





ASSESSMENT/PLAN


Assessment/Plan


Shaw catheter in place: Nursing staff to maintain. Patient will need to 

discharge with this device in place.


Pt had 101.5 fever last night, continue antibiotics. 


An appointment has been arranged for him to see Dr. Hidalgo next week for voiding 

trial  on 11/21/18 at 0940 am at [facility] location  Appointment card and patient 

paperwork given to patient yesterday and he still has this. 


Will follow peripherally until discharge.


Problems:  


(1) Urinary retention


(2) Shaw catheter in place





COMMENT


Lab





Laboratory Tests








Test


 11/14/18


05:25


 


Sodium Level


 137 mmol/L


(136-145)


 


Potassium Level


 4.2 mmol/L


(3.5-5.1)


 


Chloride Level


 106 mmol/L


()


 


Carbon Dioxide Level


 22 mmol/L


(21-32)


 


Anion Gap 9 (6-14) 


 


Blood Urea Nitrogen


 24 mg/dL


(8-26)


 


Creatinine


 1.7 mg/dL


(0.7-1.3)


 


Estimated GFR


(Cockcroft-Gault) 39.9 





 


Glucose Level


 102 mg/dL


(70-99)


 


Calcium Level


 8.1 mg/dL


(8.5-10.1)


 


Magnesium Level


 1.8 mg/dL


(1.8-2.4)

















ADRIAN DUKE Nov 14, 2018 10:16
SUBJECTIVE


Subjective


Pt had a good night.  Catheter is not bothering him.





OBJECTIVE


Objective


Physical Exam: 


General appearance: Alert and Oriented


Head: Normocephalic, without obvious abnormality


Eyes: conjunctivae/corneas clear. PERRL, EOM's intact. Fundi benign


Lungs: regular respirations, non labored breathing. 


Abdomen: soft, non-tender.  No masses,  no organomegaly


Pelvic: shaw catheter in place draining clear yellow urine.  Device in good 

working order.


Vital Signs





Vital Signs








  Date Time  Temp Pulse Resp B/P (MAP) Pulse Ox O2 Delivery O2 Flow Rate FiO2


 


11/16/18 07:00 98.3 74 18 155/75 (101) 96 Room Air  





 98.3       


 


11/16/18 03:44 98.3 71 18 145/79 (101) 97 Room Air  





 98.3       


 


11/15/18 23:41 98.3 68 18 138/66 (90) 97 Room Air  





 98.3       


 


11/15/18 23:12      Room Air  


 


11/15/18 22:12      Room Air  


 


11/15/18 20:33  74  137/73    


 


11/15/18 20:00      Room Air  


 


11/15/18 19:43 98.2 74 18 137/73 (94) 97 Room Air  





 98.2       


 


11/15/18 14:58 98.1 67 18 125/63 (83) 96 Room Air  





 98.1       


 


11/15/18 10:45 97.5 71 18 150/89 (109) 97 Room Air  





 97.5       


 


11/15/18 09:40  69  142/79    








I & O











Intake and Output 


 


 11/16/18





 07:00


 


Intake Total 950 ml


 


Output Total 4400 ml


 


Balance -3450 ml


 


 


 


Intake Oral 950 ml


 


Output Urine Total 4400 ml


 


# Voids 2











PHYSICAL EXAM


Physical Exam


Physical Exam: 


General appearance: Alert and Oriented


Head: Normocephalic, without obvious abnormality


Eyes: conjunctivae/corneas clear. PERRL, EOM's intact. Fundi benign


Lungs: regular respirations, non labored breathing. 


Abdomen: soft, non-tender.  No masses,  no organomegaly


Pelvic: shaw catheter in place draining clear yellow urine.  Device in good 

working order.





ASSESSMENT/PLAN


Assessment/Plan


Ok for patient to discharge from a Urology perspective.  


Whenever he does go home he should go home with shaw catheter in [facility].  

Nursing to teach shaw catheter care prior to discharge and may provide extra 

shaw catheter bags and leg straps as necessary.  


Continue Flomax while in house and as an outpatient. 


An appointment has been arranged for him to see Dr. Hidalgo next week for voiding 

trial  on 11/21/18 at 0940 am at [facility] location.  Pt still has appointment card 

and new patient paperwork. 


WBC 5.1, CRT 1.6, BUN 17.  Currently afebrile. Last fever was 99.5 on 11/14/16 

at 1930.


Discussed above with attending RN.


Will sign off at this time, but please call with questions or change in patient 

condition.


Problems:  


(1) Shaw catheter in place


(2) Urinary retention





COMMENT


Lab





Laboratory Tests








Test


 11/16/18


04:15


 


Sodium Level


 141 mmol/L


(136-145)


 


Potassium Level


 4.1 mmol/L


(3.5-5.1)


 


Chloride Level


 104 mmol/L


()


 


Carbon Dioxide Level


 28 mmol/L


(21-32)


 


Anion Gap 9 (6-14) 


 


Blood Urea Nitrogen


 17 mg/dL


(8-26)


 


Creatinine


 1.6 mg/dL


(0.7-1.3)


 


Estimated GFR


(Cockcroft-Gault) 42.8 





 


Glucose Level


 96 mg/dL


(70-99)


 


Calcium Level


 8.5 mg/dL


(8.5-10.1)

















ADRIAN DUKE Nov 16, 2018 08:43
.